# Patient Record
Sex: MALE | Race: WHITE | Employment: OTHER | URBAN - NONMETROPOLITAN AREA
[De-identification: names, ages, dates, MRNs, and addresses within clinical notes are randomized per-mention and may not be internally consistent; named-entity substitution may affect disease eponyms.]

---

## 2017-12-27 ENCOUNTER — HOSPITAL ENCOUNTER (OUTPATIENT)
Dept: PHYSICAL THERAPY | Age: 57
Setting detail: THERAPIES SERIES
Discharge: HOME | End: 2017-12-27
Payer: OTHER GOVERNMENT

## 2017-12-27 PROCEDURE — 97161 PT EVAL LOW COMPLEX 20 MIN: CPT

## 2017-12-27 PROCEDURE — 97110 THERAPEUTIC EXERCISES: CPT

## 2017-12-27 ASSESSMENT — PAIN DESCRIPTION - PAIN TYPE: TYPE: CHRONIC PAIN

## 2017-12-27 ASSESSMENT — PAIN DESCRIPTION - LOCATION: LOCATION: BACK;HIP

## 2017-12-27 ASSESSMENT — PAIN SCALES - GENERAL: PAINLEVEL_OUTOF10: 8

## 2017-12-27 NOTE — PROGRESS NOTES
I certify that I have examined the patient below and determined that Physical Medicine and Rehabilitation service is necessary; that the secondary diagnosis for the provision of rehabilitation services is consistent with identified needs; that service will be furnished on an outpatient basis while the patient is in my care; that I approve the above plan of care for up to 90 days or as specifically noted above and will review it within that time frame or more often if the patients condition requires. Attestation, signature or co-signature of physician indicates approval of certification requirements.    ________________________ ____________ __________  Physician Signature   Date   Time       [address]     Time In: 1500  Time Out: 1550  Minutes: 50  Timed Code Treatment Minutes: 10 Minutes (Ther EX)     Date: 2017  Patient Name: Beth Whitley,  Gender:  male        CSN: [id number]   : 1960  (56 y.o.)  Referral Date : 17     Referring Practitioner: Ellie Antonio      Diagnosis: Low back pain M54.5  Treatment Diagnosis: Lumbago, spinal stiffness, B hip pain, B hip stiffness, muscular weakness  Additional Pertinent Hx: Hx of heart attack (mini MI, non surgical), HTN, arthritis, SOB and chest pain occasional       General:  PT Visit Information  Onset Date: 17  PT Insurance Information: [place] - 1-2 times per week for 6-8 weeks total 12 visits (auth 17 to 18) - Aquatic YES, modalities YES, no precert  Total # of Visits Approved: 12  Total # of Visits to Date: 1  Plan of Care/Certification Expiration Date: 18  Progress Note Counter: 1/10 for PN       See Medical History Questionnaire for information related to allergies and medications.     Subjective:  Chart Reviewed: Yes  Patient assessed for rehabilitation services?: Yes  Family / Caregiver Present: Yes (Spouse)  Comments: Physician MARTA POS, hip flexor prone tightness    LLE General PROM: Hamstring 0-90 deg, knee to chest WFL, knee to opp shoulder painful, MARTA POS, hip flexor prone tightness    Lumbar: Lumbar flexion 0-90 deg standing, extension 0-10 deg painful, sidebend 0-10 deg painful and guarded    Strength RLE: Exception  Comment: Hip ext 4/5, abd 4/5, SLR 4-/5 R hip pain, knee ext 5/5    Strength LLE: Exception  Comment: Hip ext 4/5, abd 4/5, SLR 4/5, knee ext 5/5    Overall Sensation Status: WNL (B foot tingling)           Supine to Sit: Independent  Sit to Supine: Independent                Transfers  Sit to Stand: Independent  Stand to sit: Independent            Ambulation 1  Surface: carpet  Device: No Device  Assistance: Independent  Quality of Gait: Decreased gait speed, decreased B step length, mild forward flexed posture  Distance: 200 ft about clinic      Stairs  # Steps : 4  Stairs Height: 6\"  Rails: None  Device: No Device  Assistance: Independent  Comment: Reciprocal pattern, no antalgia, no LOB      Balance  Single Leg Stance R Leg: 10  Single Leg Stance L Leg: 10           Exercises  Exercise 1: HEP handout reviewed with patient - posterior tilt x5 reps, bridge x2 reps, prone hip flexor stretch, supine hamstring stretch, knee to opp shoulder, and LTR with knee across body  Exercise 2: 3 way hip x3 reps each LE - mild hip pain R SLR  Exercise 3: Next visit - Nu Step warm up, 3 way hip strength, hip and piriformis stretching, core stabilization        Activity Tolerance:  Activity Tolerance: Patient Tolerated treatment well    Assessment: Body structures, Functions, Activity limitations: Decreased ROM, Decreased strength, Decreased high-level IADLs  Assessment: Patient presents with chronic pain in lumbar spine, B SI, and B hips consistent with physician diagnosis. 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  He would benefit from PT to increase flexibility and strength for lumbar stabilization and improved tolerance for work tasks. Prognosis: Excellent       Patient Education:  Patient Education: HEP handout provided - knee to opp shoulder, LTR, prone hip flexor stretch, supine hamstring stretch, posterior tilt, bridge                   Plan:  Times per week: 2x/week  Plan weeks: 8 weeks  Specific instructions for Next Treatment: Next visit - Nu Step warm up, 3 way hip strength, hip and piriformis stretching, core stabilization  Current Treatment Recommendations: Strengthening, ROM, Balance Training, Stair training, Gait Training, Manual Therapy - Joint Manipulation, Manual Therapy - Soft Tissue Mobilization, Home Exercise Program, Modalities (Modalities may include ultrasound, electrical stim with heat/cold, aquatic therapy, or traction as needed for pain)  Plan Comment: Initiated PT POC    History: Personal factors or comorbidities that impact plan of care -  Moderate Complexity: 1-2 personal factors or comorbidities. See history section above for details. Examination: Body structures and functions, activity limitations, participation restrictions; using standardized tests and measures - Moderate Complexity: 3 or morebody structures and functional, activity limitations and/or participation restrictions. See restrictions and objective section above for details. Clinical Presentation: Low - Stable and Uncomplicated: chronic LBP, unable to reproduce radicular symptoms    Decision Making: Low Complexity due to see above. Decision making was based on patient assessment and decision making process in determining plan of care and establishing reasonable expectations for measurable functional outcomes. Evaluation Complexity: Based on the findings of patient history, examination, clinical presentation, and decision making during this evaluation, the evaluation of Hank Nelson  is of low complexity.     Goals:  Patient goals : Decrease back and hip pain, work without

## 2018-01-02 ENCOUNTER — HOSPITAL ENCOUNTER (OUTPATIENT)
Dept: PHYSICAL THERAPY | Age: 58
Setting detail: THERAPIES SERIES
Discharge: HOME | End: 2018-01-02
Payer: OTHER GOVERNMENT

## 2018-01-02 PROCEDURE — 97110 THERAPEUTIC EXERCISES: CPT

## 2018-01-02 ASSESSMENT — PAIN SCALES - GENERAL: PAINLEVEL_OUTOF10: 8

## 2018-01-02 ASSESSMENT — PAIN DESCRIPTION - ORIENTATION: ORIENTATION: RIGHT;LEFT;LOWER

## 2018-01-02 ASSESSMENT — PAIN DESCRIPTION - PAIN TYPE: TYPE: CHRONIC PAIN

## 2018-01-02 ASSESSMENT — PAIN DESCRIPTION - LOCATION: LOCATION: BACK;HIP

## 2018-01-02 NOTE — PROGRESS NOTES
tilt with marching x5 each LE, bridge 5 sec x10 reps   Exercise 4: prone hip flexor stretch with strap 3x 10 sec, supine B hamstring stretch with strap 2x 30 sec, knee to opp shoulder 2x 30 sec, and LTR with knee across body 2x 20 sec         Activity Tolerance:  Activity Tolerance: Patient Tolerated treatment well    Assessment: Body structures, Functions, Activity limitations: Decreased ROM, Decreased strength, Decreased high-level IADLs  Assessment: Reviewed HEP with mod verbal cues for technique and use of strap. Occasional hamstring cramping during session especially with bridges. Primary compliant is hip pain today. Prognosis: Excellent       Patient Education:  Patient Education: HEP handout Reviewed - knee to opp shoulder, LTR, prone hip flexor stretch, supine hamstring stretch, posterior tilt, bridge. Patient asking about heating pad - advised that it can be easier to stretch after using heat    Plan:  Times per week: 2x/week  Plan weeks: 8 weeks  Specific instructions for Next Treatment: Next visit - Nu Step warm up, 3 way hip strength, hip and piriformis stretching, core stabilization  Current Treatment Recommendations: Strengthening, ROM, Balance Training, Stair training, Gait Training, Manual Therapy - Joint Manipulation, Manual Therapy - Soft Tissue Mobilization, Home Exercise Program, Modalities (Modalities may include ultrasound, electrical stim with heat/cold, aquatic therapy, or traction as needed for pain)  Plan Comment: Continue PT POC    Goals:  Patient goals : Decrease back and hip pain, work without difficulty    Short term goals  Time Frame for Short term goals: 4 weeks  Short term goal 1: Patient will report decreased back and hip pain from baseline 8/10 to less than 4/10 in order to tolerate sleeping without disruption.   Short term goal 2: Patient will increase B hamstring flexibility to 0-100 deg hip flexion, prone quad stretch to 0-100 deg knee flex, and knee to opp shoulder WFL for piriformis in order to decrease hip pain. Short term goal 3: Patient will demonstrate good abdominal brace with SLR x4 directions no hip pain or back pain in order to stabilize lumbar with reaching for objects at work. Long term goals  Time Frame for Long term goals : 8 weeks  Long term goal 1: Patient will improve score for Lower Extremity Functional Index from baseline 50% impairment to less than 40% impairment. Long term goal 2: Patient will lunge, squat, rise from floor to standing, lunge walk forward and lateral all without increased pain in order to perform work tasks. Long term goal 3: Patient will report no difficulty standing long durations or climbing stairs at work. Long term goal 4: Patient will be IND with HEP.           Serenity Chavira, PT

## 2018-01-04 ENCOUNTER — HOSPITAL ENCOUNTER (OUTPATIENT)
Dept: PHYSICAL THERAPY | Age: 58
Setting detail: THERAPIES SERIES
Discharge: HOME | End: 2018-01-04
Payer: OTHER GOVERNMENT

## 2018-01-04 PROCEDURE — 97110 THERAPEUTIC EXERCISES: CPT

## 2018-01-04 ASSESSMENT — PAIN SCALES - GENERAL: PAINLEVEL_OUTOF10: 7

## 2018-01-09 ENCOUNTER — HOSPITAL ENCOUNTER (OUTPATIENT)
Dept: PHYSICAL THERAPY | Age: 58
Setting detail: THERAPIES SERIES
Discharge: HOME | End: 2018-01-09
Payer: OTHER GOVERNMENT

## 2018-01-11 ENCOUNTER — HOSPITAL ENCOUNTER (OUTPATIENT)
Dept: PHYSICAL THERAPY | Age: 58
Setting detail: THERAPIES SERIES
Discharge: HOME | End: 2018-01-11
Payer: OTHER GOVERNMENT

## 2018-01-11 PROCEDURE — 97110 THERAPEUTIC EXERCISES: CPT

## 2018-01-11 ASSESSMENT — PAIN SCALES - GENERAL: PAINLEVEL_OUTOF10: 6

## 2018-01-11 ASSESSMENT — PAIN DESCRIPTION - PAIN TYPE: TYPE: CHRONIC PAIN

## 2018-01-11 ASSESSMENT — PAIN DESCRIPTION - LOCATION: LOCATION: BACK;HIP

## 2018-01-11 NOTE — PROGRESS NOTES
[place]     Time In: 1630  Time Out: 1710  Minutes: 40  Timed Code Treatment Minutes: 40 Minutes     Date: 2018  Patient Name: Btety Mcdaniel,  Gender:  male        CSN: [id number]   : 1960  (56 y.o.)  Referral Date : 17    Referring Practitioner: Mandy Cooper      Diagnosis: Low back pain M54.5  Treatment Diagnosis: Lumbago, spinal stiffness, B hip pain, B hip stiffness, muscular weakness   Additional Pertinent Hx: Hx of heart attack (mini MI, non surgical), HTN, arthritis, SOB and chest pain occasional                  General:  PT Visit Information  Onset Date: 17  PT Insurance Information: [place] - 1-2 times per week for 6-8 weeks total 12 visits (auth 17 to 18) - Aquatic YES, modalities YES, no precert  Total # of Visits Approved: 12  Total # of Visits to Date: 4  Plan of Care/Certification Expiration Date: 18  Progress Note Counter: 4/10 for PN               Subjective:  Chart Reviewed: Yes  Response To Previous Treatment: Patient with no complaints from previous session. Family / Caregiver Present: Yes  Comments: Physician follow up: Dr. Ricardo Gardiner unknown  Other (Comment): 1-2x/week 6-8 weeks, max 12 visits     Subjective: I'm sore  in my LB and hips. Doing HEP and using heat off/on     Pain:  Patient Currently in Pain: Yes  Pain Level: 6  Pain Type: Chronic pain  Pain Location: Back, Hip      Objective         Exercises  Exercise 1: NuStep warm up (Seat 9, Arms 11) Level 3, x6 minutes - cues for upright posture   Exercise 3: Seated on yellow swiss ball x15 rows/sh ext yellow band , marching 1 leg at a time  mild diff with core stabs Denied pain  Exercise 6: Standing with bracing: heel raises, squats, 3-way hip x15 each   Exercise 7: Standing B hamstring stretches x3 hold 10 sec. at steps   Exercise 8:  In hooklying: SKTC x 3 hold 10 sec B., Diag KTC x3 B hold 10 sec., LTR x3 hold 10 sec B, piriformis pushing knee away from body in #4# position         Activity Tolerance:  Activity Tolerance: Patient Tolerated treatment well  Activity Tolerance: Added standing hamstring stretch, variations of LTR and piriformis for HEP, Progressed core stabs and tolerated well. Assessment: Body structures, Functions, Activity limitations: Decreased ROM, Decreased strength, Decreased high-level IADLs  Assessment: Pain 6-7 /10 after session. Cues( occ tactile)  for abd braicng with functional activites. No cramping stated today. Monitor any pain from todays session. Progressed with gentle nautilus and swiss ball exs. Cues for posture. Prognosis: Excellent       Patient Education:  Patient Education: Continue HEP, use of heat before/during exercises. . LTR, piriformis, st hamstring                      Plan:  Times per week: 2x/week  Plan weeks: 8 weeks  Specific instructions for Next Treatment: Next visit - Nu Step warm up, 3 way hip strength, hip and piriformis stretching, core stabilization  Current Treatment Recommendations: Strengthening, ROM, Balance Training, Stair training, Gait Training, Manual Therapy - Joint Manipulation, Manual Therapy - Soft Tissue Mobilization, Home Exercise Program, Modalities (Modalities may include ultrasound, electrical stim with heat/cold, aquatic therapy, or traction as needed for pain)  Plan Comment: Continue with current POC    Goals:  Patient goals : Decrease back and hip pain, work without difficulty    Short term goals  Time Frame for Short term goals: 4 weeks  Short term goal 1: Patient will report decreased back and hip pain from baseline 8/10 to less than 4/10 in order to tolerate sleeping without disruption. Short term goal 2: Patient will increase B hamstring flexibility to 0-100 deg hip flexion, prone quad stretch to 0-100 deg knee flex, and knee to opp shoulder WFL for piriformis in order to decrease hip pain.   Short term goal 3: Patient will demonstrate

## 2018-01-16 ENCOUNTER — HOSPITAL ENCOUNTER (OUTPATIENT)
Dept: PHYSICAL THERAPY | Age: 58
Setting detail: THERAPIES SERIES
Discharge: HOME | End: 2018-01-16
Payer: OTHER GOVERNMENT

## 2018-01-16 PROCEDURE — 97110 THERAPEUTIC EXERCISES: CPT

## 2018-01-16 ASSESSMENT — PAIN DESCRIPTION - PAIN TYPE: TYPE: CHRONIC PAIN

## 2018-01-16 ASSESSMENT — PAIN DESCRIPTION - LOCATION: LOCATION: HIP;BACK

## 2018-01-16 ASSESSMENT — PAIN SCALES - GENERAL: PAINLEVEL_OUTOF10: 8

## 2018-01-16 ASSESSMENT — PAIN DESCRIPTION - ORIENTATION: ORIENTATION: LEFT;RIGHT;LOWER

## 2018-01-18 ENCOUNTER — HOSPITAL ENCOUNTER (OUTPATIENT)
Dept: PHYSICAL THERAPY | Age: 58
Setting detail: THERAPIES SERIES
Discharge: HOME | End: 2018-01-18
Payer: OTHER GOVERNMENT

## 2018-01-18 PROCEDURE — 97110 THERAPEUTIC EXERCISES: CPT

## 2018-01-18 ASSESSMENT — PAIN SCALES - GENERAL: PAINLEVEL_OUTOF10: 9

## 2018-01-18 NOTE — PROGRESS NOTES
[place]     Time In: 8887  Time Out: [address]  Minutes: 38  Timed Code Treatment Minutes: 45 Minutes     Date: 2018  Patient Name: Gianni Hays,  Gender:  male        CSN: [id number]   : 1960  (56 y.o.)  Referral Date : 17    Referring Practitioner: Ellis Cortes      Diagnosis: Low back pain M54.5  Treatment Diagnosis: Lumbago, spinal stiffness, B hip pain, B hip stiffness, muscular weakness   Additional Pertinent Hx: Hx of heart attack (mini MI, non surgical), HTN, arthritis, SOB and chest pain occasional        General:  PT Visit Information  Onset Date: 17  PT Insurance Information: [place] - 1-2 times per week for 6-8 weeks total 12 visits (auth 17 to 18) - Aquatic YES, modalities YES, no precert  Total # of Visits Approved: 12  Total # of Visits to Date: 6  Plan of Care/Certification Expiration Date: 18  Progress Note Counter: 6/10 for PN             Subjective:  Chart Reviewed: Yes  Family / Caregiver Present: Yes  Comments: Physician follow up: Dr. Jefferson Nevarez unknown  Other (Comment): 1-2x/week 6-8 weeks, max 12 visits     Subjective: Patient states he has been working 12 and 16 hour days because of overtime at work and is having more pain today. Reports he is trying to remember to keep his stomach muscles tight but doesn't always remember.       Pain:  Patient Currently in Pain: Yes  Pain Assessment: 0-10  Pain Level: 9 (8-9/10 Lower back, Bilateral hips)    Objective         Exercises  Exercise 1: NuStep warm up (Seat 9, Arms 11) Level 3, x6 minutes - cues for upright posture   Exercise 2: Hooklying hip adduction set ball squeeze with bracing 10x 5 seconds, hip abduction with red theraband x10, straight leg raises with bracing x10 bilateral   Exercise 3: Seated on yellow swiss ball: UE rows, shoulder extension, horizontal abduction with yellow band, marching 1 leg at a time x10 each - mild diff with core stabs Denied pain  Exercise 5: Reviewed education on use of pillow between knees for sidelying position and log rolling technique with bed mobility and use of heat  Exercise 6: Standing with bracing: heel raises, marches, squats, 3-way hip x15 each   Exercise 7: Standing Bilateral hamstring stretches x3 hold 10 seconds at steps   Exercise 8: In hooklying: SKTC, Diag KTC, LTR x3 hold 15 seconds Bilateral, piriformis pushing knee away from body in figure 4 position 3x 15 seconds       Activity Tolerance:  Activity Tolerance: Patient Tolerated treatment well  Activity Tolerance: Pain remained 8-9/10 at end of session. Assessment: Body structures, Functions, Activity limitations: Decreased functional mobility , Decreased ROM, Decreased strength, Decreased endurance  Assessment: Patient continues to require frequent cues for abdominal bracing with exercises. Did not progress exercises this date due to patient having higher pain levels. Pain rated 7-8/10 at end of session. Patient Education:  Patient Education: Continue HEP, use of heat before/during exercises.      Plan:  Times per week: 2x/week  Plan weeks: 8 weeks  Specific instructions for Next Treatment: NuStep warm up, 3 way hip strength, hip and piriformis stretching, core stabilization  Current Treatment Recommendations: Strengthening, ROM, Balance Training, Stair training, Gait Training, Manual Therapy - Joint Manipulation, Manual Therapy - Soft Tissue Mobilization, Home Exercise Program, Modalities (Modalities may include ultrasound, electrical stim with heat/cold, aquatic therapy, or traction as needed for pain)  Plan Comment: Continue with current POC    Goals:  Patient goals : Decrease back and hip pain, work without difficulty    Short term goals  Time Frame for Short term goals: 4 weeks  Short term goal 1: Patient will report decreased back and hip pain from baseline 8/10 to less than 4/10 in order to tolerate sleeping without disruption. Short term goal 2: Patient will increase B hamstring flexibility to 0-100 deg hip flexion, prone quad stretch to 0-100 deg knee flex, and knee to opp shoulder WFL for piriformis in order to decrease hip pain. Short term goal 3: Patient will demonstrate good abdominal brace with SLR x4 directions no hip pain or back pain in order to stabilize lumbar with reaching for objects at work. Long term goals  Time Frame for Long term goals : 8 weeks  Long term goal 1: Patient will improve score for Lower Extremity Functional Index from baseline 50% impairment to less than 40% impairment. Long term goal 2: Patient will lunge, squat, rise from floor to standing, lunge walk forward and lateral all without increased pain in order to perform work tasks. Long term goal 3: Patient will report no difficulty standing long durations or climbing stairs at work. Long term goal 4: Patient will be IND with HEP. Jacoby Alvarado.  Jacob Garrett, [facility], 1/18/2018

## 2018-01-23 ENCOUNTER — APPOINTMENT (OUTPATIENT)
Dept: PHYSICAL THERAPY | Age: 58
End: 2018-01-23
Payer: OTHER GOVERNMENT

## 2018-01-25 ENCOUNTER — APPOINTMENT (OUTPATIENT)
Dept: PHYSICAL THERAPY | Age: 58
End: 2018-01-25
Payer: OTHER GOVERNMENT

## 2018-01-30 ENCOUNTER — HOSPITAL ENCOUNTER (OUTPATIENT)
Dept: PHYSICAL THERAPY | Age: 58
Setting detail: THERAPIES SERIES
Discharge: HOME | End: 2018-01-30
Payer: OTHER GOVERNMENT

## 2018-01-30 PROCEDURE — 97110 THERAPEUTIC EXERCISES: CPT

## 2018-01-30 ASSESSMENT — PAIN DESCRIPTION - ORIENTATION: ORIENTATION: LOWER;LEFT;RIGHT

## 2018-01-30 ASSESSMENT — PAIN SCALES - GENERAL: PAINLEVEL_OUTOF10: 7

## 2018-01-30 ASSESSMENT — PAIN DESCRIPTION - LOCATION: LOCATION: BACK

## 2018-01-30 ASSESSMENT — PAIN DESCRIPTION - PAIN TYPE: TYPE: CHRONIC PAIN

## 2018-01-30 NOTE — PROGRESS NOTES
[address] [facility]     Time In: 8082  Time Out: 1594  Minutes: 50  Timed Code Treatment Minutes: 48 Minutes     Date: 2018  Patient Name: Gianni Hays,  Gender:  male        CSN: [id number]   : 1960  (56 y.o.)  Referral Date : 17    Referring Practitioner: Ellis Cortes      Diagnosis: Low back pain M54.5  Treatment Diagnosis: Lumbago, spinal stiffness, B hip pain, B hip stiffness, muscular weakness   Additional Pertinent Hx: Hx of heart attack (mini MI, non surgical), HTN, arthritis, SOB and chest pain occasional       General:  PT Visit Information  Onset Date: 17  PT Insurance Information: [place] - 1-2 times per week for 6-8 weeks total 12 visits (auth 17 to 18) - Aquatic YES, modalities YES, no precert  Total # of Visits Approved: 12  Total # of Visits to Date: 7  Plan of Care/Certification Expiration Date: 18  Progress Note Counter: 7/10 for PN             Subjective:  Chart Reviewed: Yes  Response To Previous Treatment: Patient with no complaints from previous session. Family / Caregiver Present: Yes  Comments: Physician follow up: Dr. Jefferson Nevarez unknown  Other (Comment): 1-2x/week 6-8 weeks, max 12 visits     Subjective: Patient states he has been working 12 hours days and will be going to 2nd shift. States he is sore after work. Patient states he doesn't feel comfortable on physioball, \" I'm all over the place. \"      Pain:  Patient Currently in Pain: Yes  Pain Level: 7  Pain Type: Chronic pain  Pain Location: Back  Pain Orientation: Lower, Left, Right    Objective         Exercises  Exercise 1: NuStep warm up (Seat 9, Arms 11) Level 3, x 10 minutes - cues for upright posture   Exercise 2: Hooklying hip adduction set ball squeeze with bracing 10x 5 seconds, hip abduction with red theraband x15, straight leg raises with bracing x10 bilateral   Exercise 3: Seated on chair and gray stabilty

## 2018-02-01 ENCOUNTER — HOSPITAL ENCOUNTER (OUTPATIENT)
Dept: PHYSICAL THERAPY | Age: 58
Setting detail: THERAPIES SERIES
Discharge: HOME | End: 2018-02-01
Payer: OTHER GOVERNMENT

## 2018-02-01 ENCOUNTER — HOSPITAL ENCOUNTER (OUTPATIENT)
Dept: PHYSICAL THERAPY | Age: 58
Setting detail: THERAPIES SERIES
End: 2018-02-01
Payer: OTHER GOVERNMENT

## 2018-02-01 NOTE — PROGRESS NOTES
[facility]  PHYSICAL THERAPY MISSED TREATMENT NOTE  OUTPATIENT REHABILITATION    Date: 2018  Patient Name: Amaya Nicole        MRN: [id number]   : 1960  (56 y.o.)  Gender: male           PT Visit Information  Canceled Appointment: 2    REASON FOR MISSED TREATMENT:  Cancelled -no reason given.       Camron Yip [id number]

## 2018-02-06 ENCOUNTER — APPOINTMENT (OUTPATIENT)
Dept: PHYSICAL THERAPY | Age: 58
End: 2018-02-06
Payer: OTHER GOVERNMENT

## 2018-02-07 ENCOUNTER — HOSPITAL ENCOUNTER (OUTPATIENT)
Dept: PHYSICAL THERAPY | Age: 58
Setting detail: THERAPIES SERIES
Discharge: HOME | End: 2018-02-07
Payer: OTHER GOVERNMENT

## 2018-02-07 PROCEDURE — 97110 THERAPEUTIC EXERCISES: CPT

## 2018-02-07 ASSESSMENT — PAIN DESCRIPTION - ORIENTATION: ORIENTATION: RIGHT;LEFT;LOWER

## 2018-02-07 ASSESSMENT — PAIN SCALES - GENERAL: PAINLEVEL_OUTOF10: 6

## 2018-02-07 ASSESSMENT — PAIN DESCRIPTION - PAIN TYPE: TYPE: CHRONIC PAIN

## 2018-02-07 ASSESSMENT — PAIN DESCRIPTION - LOCATION: LOCATION: BACK;HIP

## 2018-02-07 NOTE — PROGRESS NOTES
[address] [facility]     Time In: 9771  Time Out: 1053  Minutes: 32  Timed Code Treatment Minutes: 32 Minutes     Date: 2018  Patient Name: Simone Stockton,  Gender:  male        CSN: [id number]   : 1960  (56 y.o.)  Referral Date : 17    Referring Practitioner: Nehemias Ferguson      Diagnosis: Low back pain M54.5  Treatment Diagnosis: Lumbago, spinal stiffness, B hip pain, B hip stiffness, muscular weakness   Additional Pertinent Hx: Hx of heart attack (mini MI, non surgical), HTN, arthritis, SOB and chest pain occasional                  General:  PT Visit Information  Onset Date: 17  PT Insurance Information: [place] - 1-2 times per week for 6-8 weeks total 12 visits (auth 17 to 18) - Aquatic YES, modalities YES, no precert  Total # of Visits Approved: 12  Total # of Visits to Date: 8  Plan of Care/Certification Expiration Date: 18  Progress Note Counter: 8/10 for PN               Subjective:  Response To Previous Treatment: Patient with no complaints from previous session. Family / Caregiver Present: No  Comments: Physician follow up: Dr. Wilkerson Catching unknown  Other (Comment): 1-2x/week 6-8 weeks, max 12 visits     Subjective: Pt reporting back pain 6/10 today and stating that pain has been worse. Pt reporting needing to leave right at or before 11 due to work schedule being changed.       Pain:  Patient Currently in Pain: Yes  Pain Assessment: 0-10  Pain Level: 6  Pain Type: Chronic pain  Pain Location: Back, Hip  Pain Orientation: Right, Left, Lower      Objective  Exercises  Exercise 2: Hook lying hip adduction set ball squeeze with bracing 10x 5 seconds, hip abduction with red theraband x15, straight leg raises with bracing x10 bilateral   Exercise 3: Seated on chair and gray stability pad per P.T: UE rows, shoulder extension, horizontal abduction with yellow band, marching 1 leg at a time x15 each ,

## 2018-02-08 ENCOUNTER — APPOINTMENT (OUTPATIENT)
Dept: PHYSICAL THERAPY | Age: 58
End: 2018-02-08
Payer: OTHER GOVERNMENT

## 2018-02-08 ENCOUNTER — HOSPITAL ENCOUNTER (OUTPATIENT)
Dept: PHYSICAL THERAPY | Age: 58
Setting detail: THERAPIES SERIES
Discharge: HOME | End: 2018-02-08
Payer: OTHER GOVERNMENT

## 2018-02-08 PROCEDURE — 97110 THERAPEUTIC EXERCISES: CPT

## 2018-02-08 ASSESSMENT — PAIN DESCRIPTION - LOCATION: LOCATION: BACK

## 2018-02-08 ASSESSMENT — PAIN SCALES - GENERAL: PAINLEVEL_OUTOF10: 7

## 2018-02-08 ASSESSMENT — PAIN DESCRIPTION - ORIENTATION: ORIENTATION: RIGHT;LEFT;LOWER

## 2018-02-08 ASSESSMENT — PAIN DESCRIPTION - PAIN TYPE: TYPE: CHRONIC PAIN

## 2018-02-08 NOTE — PROGRESS NOTES
a time x15 each , LAQ x 10  Exercise 6: Standing with bracing on blue foam: heel raises, Toe raises, marches, squats, 3-way hip x15 each   Exercise 7: Standing Bilateral hamstring stretches x3 hold 10 seconds at steps   Exercise 8: In hooklying: SKTC, Diag KTC, LTR x3 hold 15 seconds Bilateral, piriformis pushing knee away from body in figure 4 position 3x 15 seconds  Exercise 9: Lunges onto BOSU x 10 B  Exercise 10: Hydro Stick with bracin directions x 10 each - no pain          Activity Tolerance:  Activity Tolerance: Patient Tolerated treatment well  Activity Tolerance: Pain \"same\" after session. Assessment: Body structures, Functions, Activity limitations: Decreased ROM, Decreased endurance, Decreased strength, Decreased functional mobility   Assessment: No progression of exs due to same PTA having pt on  and pt informed next  next therapist \" she killed me\" after adding swiss ball exs. Pt denied increased pain today. Emphasized HEP/abd braicng. Prognosis: Excellent  Discharge Recommendations: Continue to assess pending progress    Patient Education:  Patient Education: Monitor repsonse to exs. Plan:  Times per week: 2x/week  Plan weeks: 8 weeks  Specific instructions for Next Treatment: NuStep warm up, 3 way hip strength, hip and piriformis stretching, core stabilization  Current Treatment Recommendations: Strengthening, ROM, Balance Training, Stair training, Gait Training, Manual Therapy - Joint Manipulation, Manual Therapy - Soft Tissue Mobilization, Home Exercise Program, Modalities  Plan Comment: Continue with current POC    Goals:  Patient goals : Decrease back and hip pain, work without difficulty    Short term goals  Time Frame for Short term goals: 4 weeks  Short term goal 1: Patient will report decreased back and hip pain from baseline 8/10 to less than 4/10 in order to tolerate sleeping without disruption.   Short term goal 2: Patient will increase B hamstring flexibility to 0-100 deg hip flexion, prone quad stretch to 0-100 deg knee flex, and knee to opp shoulder WFL for piriformis in order to decrease hip pain. Short term goal 3: Patient will demonstrate good abdominal brace with SLR x4 directions no hip pain or back pain in order to stabilize lumbar with reaching for objects at work. Long term goals  Time Frame for Long term goals : 8 weeks  Long term goal 1: Patient will improve score for Lower Extremity Functional Index from baseline 50% impairment to less than 40% impairment. Long term goal 2: Patient will lunge, squat, rise from floor to standing, lunge walk forward and lateral all without increased pain in order to perform work tasks. Long term goal 3: Patient will report no difficulty standing long durations or climbing stairs at work. Long term goal 4: Patient will be IND with HEP.           Polo Novel  [id number]

## 2018-02-13 ENCOUNTER — HOSPITAL ENCOUNTER (OUTPATIENT)
Dept: PHYSICAL THERAPY | Age: 58
Setting detail: THERAPIES SERIES
Discharge: HOME | End: 2018-02-13
Payer: OTHER GOVERNMENT

## 2018-02-13 ENCOUNTER — APPOINTMENT (OUTPATIENT)
Dept: PHYSICAL THERAPY | Age: 58
End: 2018-02-13
Payer: OTHER GOVERNMENT

## 2018-02-13 PROCEDURE — 97110 THERAPEUTIC EXERCISES: CPT

## 2018-02-13 ASSESSMENT — PAIN DESCRIPTION - ORIENTATION: ORIENTATION: RIGHT;LEFT;LOWER

## 2018-02-13 ASSESSMENT — PAIN DESCRIPTION - PAIN TYPE: TYPE: CHRONIC PAIN

## 2018-02-13 ASSESSMENT — PAIN DESCRIPTION - LOCATION: LOCATION: BACK

## 2018-02-13 ASSESSMENT — PAIN SCALES - GENERAL: PAINLEVEL_OUTOF10: 8

## 2018-02-13 NOTE — PROGRESS NOTES
Straight leg raises with bracing x10 bilateral flexion, abduction, and prone extension - no increased pain  Exercise 4: Prone hip flexor stretch with strap 3x 10 seconds; Supine Bilateral hamstring stretch with strap 2x 30 seconds, knee to opposite shoulder 2x 30 seconds  Exercise 6: Standing with bracing mini squats x10, lunge walking forward and lateral in // bars (5 ft) with good abdominal bracing. Exercise 9: Lunges onto BOSU x 10 B         Activity Tolerance:  Activity Tolerance: Patient Tolerated treatment well    Assessment:  Assessment: Patient has made minimal progress toward therapy goals. He has same complaints of pain in B hips and low back, increased with standing and climbing steps at work. He demonstrates improved hip and abdominal core strength, and improved flexibility. He is compliant with HEP exercises. Recommend patient follow up with physician regarding continued pain in order to tolerate work tasks and sleep comfortably. Patient Education:  Patient Education: Reviewed HEP - continue stretches and leg raises, standing strength exercises and abdominal bracing. Plan:  Plan Comment: Discharge from PT    Goals:  Patient goals : Decrease back and hip pain, work without difficulty - Not met 2/13/18 - pain levels remain high, working increases pain each shift    Short term goals  Time Frame for Short term goals: 4 weeks  Short term goal 1: Patient will report decreased back and hip pain from baseline 8/10 to less than 4/10 in order to tolerate sleeping without disruption. - Not met 2/13/18 - pain levels remain high, 8/10. Sleeping is still limited due to pain. Short term goal 2: Patient will increase B hamstring flexibility to 0-100 deg hip flexion, prone quad stretch to 0-100 deg knee flex, and knee to opp shoulder WFL for piriformis in order to decrease hip pain.  - Not met 2/13/18 - knee to opp shoulder WFL but tight, hamstring B 0-85 deg,   Short term goal 3: Patient will demonstrate good abdominal brace with SLR x4 directions no hip pain or back pain in order to stabilize lumbar with reaching for objects at work. - Goal Met 2/13/18 - 10 reps each direction, demonstrates good abdominal bracing. \"These used to give me muscle cramps but not today\"     Long term goals  Time Frame for Long term goals : 8 weeks   Long term goal 1: Patient will improve score for Lower Extremity Functional Index from baseline 50% impairment to less than 40% impairment. - Not Met 2/13/18 - minimal change - 44% impairment   Long term goal 2: Patient will lunge, squat, rise from floor to standing, lunge walk forward and lateral all without increased pain in order to perform work tasks. - Goal met 2/13/18 - denies increased pain with these activities, but slow and guarded during activities. Long term goal 3: Patient will report no difficulty standing long durations or climbing stairs at work. 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    Long term goal 4: Patient will be IND with HEP. - Goal Met 2/13/18          Tommy Valentino, PT

## 2021-03-04 ENCOUNTER — HOSPITAL ENCOUNTER (OUTPATIENT)
Dept: PHYSICAL THERAPY | Age: 61
Setting detail: THERAPIES SERIES
Discharge: HOME | End: 2021-03-04
Payer: OTHER GOVERNMENT

## 2021-03-04 PROCEDURE — 97162 PT EVAL MOD COMPLEX 30 MIN: CPT

## 2021-03-04 PROCEDURE — 97110 THERAPEUTIC EXERCISES: CPT

## 2021-03-04 NOTE — FLOWSHEET NOTE
** PLEASE SIGN, DATE AND TIME CERTIFICATION BELOW AND RETURN TO [facility] OUT[facility] (FAX #: [phone]). ATTEST/CO-SIGN IF ACCESSING VIA INTocomail. THANK YOU.**    I certify that I have examined the patient below and determined that Physical Medicine and Rehabilitation service is necessary and that I approve the established plan of care for up to 90 days or as specifically noted. Attestation, signature or co-signature of physician indicates approval of certification requirements.    ________________________ ____________ __________  Physician Signature   Date   Time  [address] [facility]  PHYSICAL THERAPY  [x] EVALUATION  [] DAILY NOTE (LAND) [] DAILY NOTE (AQUATIC ) [] PROGRESS NOTE [] DISCHARGE NOTE    [x] [address] [facility]   [] [place] [address]    [] [facility]   [] [facility]    Date: 3/4/2021  Patient Name:  Kimberlee Lopez  : 1960  MRN: [id number]  CSN: [id number]    Referring Practitioner Emelda Dubin, DO   Diagnosis Low back pain [M54.5]    Treatment Diagnosis Low back pain, B hip pain, stiffness in spine and hips, muscle weakness, difficulty walking, poor posture   Date of Evaluation 3/4/21    Additional Pertinent History Multiple MI (most recent 2019) with 3 stents, HTN, a-fib, emphysema with SOB, anxiety      Functional Outcome Measure Used Back Index   Functional Outcome Score 54% disability (3/4/21)       Insurance: Primary: Payor: 'S ADMINISTRATION /  /  / ,   Secondary:    Authorization Information: No precert; aquatics, modalities are covered, no massage   Visit # 1, 1/10 for progress note   Visits Allowed: 15 visits   Recertification Date:    Physician Follow-Up: May 2021? Physician Orders:    History of Present Illness: Patient presents with chronic low back pain. Patient reports that 20-25 years ago he was moving a refrigerator and \"put my back out\" resulting in injury to L5. Patient went to chiropractor extensively for 2.5 years with symptoms eventually subsiding. Then 5-6 years ago, he began having pain in the low back and bilateral hips. He reports recent imaging revealed presence of arthritis in both hips. Patient takes hydrocodone daily to manage this but would like to reduce usage of pain medication. Patient reports use of heating pad seems to help as well. Patient reports that once in a great while he will experience shooting pain into LLE. Patient denies presence of numbness or tingling in BLE. Negative valsalva. SUBJECTIVE: Patient reports 5/10 pain in central low back. Pain does at times elevate to 9/10. Patient notes very poor sleep and is getting 2-3 hours per night due to being unable to get comfortable in bed. Patient is taking sleeping medication for this now. Stair climbing is very painful due to hip pain and has to climb non-reciprocally. Patient reports he was recently approved for disability as he could not meet job requirements and last date worked ([employer]) 9/23/2020. Patient reports next step would be pain management for injections as he was told it is not to the point of surgery. Social/Functional History and Current Status:  Medications and Allergies have been reviewed and are listed on Medical History Questionnaire. Kimberlee Lopez lives with spouse in a single story home with stairs and no handrail to enter. Task Previous Current   ADLs  Independent Modified Independent; difficulty crossing legs to benedict socks and shoes   IADL's Independent Modified Independent; Has to break tasks up into 15 minute intervals. Increased time to complete tasks. 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 Ambulation  Independent Independent; Walking on concrete or blacktop is limited to ~30 minutes but softer surfaces maybe longer. Uses cane when pain is severe. Transfers  Independent Modified Independent; uses cane to get up   Driving Active  Active    Work On Disability  On Disability     OBJECTIVE:  Pain Low back 5/10 central spine   Palpation TTP spinous process L1-S1, lumbar paraspinals and QL R>L   Observation    Posture fair ; FHP with rounded shoulders, sits in L lateral trunk shift and tends to stand forward flexed       Range of Motion see below   Strength see below; core strength is poor with patient needing increased cues to isolate TrA. Sensation WNL   Bed Mobility WNL   Transfers WNL   Ambulation Independent  Distance: 200 feet in clinic  Device:No Device  Gait Deviations:   Forward Flexed Posture, bilateral trendelenberg, antalgia, decreased stance phase on RLE   Balance Not Tested   Special Tests Lumbar distraction did not alter symptoms, MARTA position painful, Hip Scour positive bilateral         BACK RANGE OF MOTION   Flexion 67 deg with pain at end range   Extension 10 deg   Sidebending Right 16 deg with pain in R sacral region   Sidebending Left 18 deg   Back Range of Motion is [facility]  []     LE STRENGTH R L   Hip Flexion 4/5 4-/5   Hip Abduction 4-/5 4/5   Hip Adduction 4+/5 4+/5   Knee Flexion 4/5 4+/5   Knee Extension 4/5 4+/5   Ankle DF 5/5 5/5   Ankle PF 5/5 5/5         TREATMENT   Precautions: Multiple MI with stents, HTN   Pain: 5/10 central low back, B hips    X in shaded column indicates activity completed today   Modalities Parameters/  Location  Notes   Moist Heat   May initiate during supine strengthening               Manual Therapy Time/Technique  Notes                     Exercise/Intervention   Notes   Piriformis & Hamstring Stretch 2B each way 20 seconds X                  Ab Brace 10 3 seconds X    Ab Brace March 10B  X    Ab Brace SAQ 10B 3 seconds X    Ab Brace ball squeeze 10B 3 seconds X    Bridge 10 3 seconds X    Clamshell, reverse clamshell                            Seated Ab Brace on [employer]       \"  \" march, LAQ       Hip abduction/adduction with ball and band       Rows, shoulder extension with Theraband       Hydrohip       Hydrostick         Specific Interventions Next Treatment: Start on land with core stabilization (abdominal bracing series) and hip strengthening in all positions but if it is not well tolerated, may consider aquatics to [place] the joints to allow ease of strengthening. Activity/Treatment Tolerance:  [x]  Patient tolerated treatment well  []  Patient limited by fatigue  []  Patient limited by pain   []  Patient limited by medical complications  []  Other:     Assessment: Patient is a 63year old male presenting with chronic low back pain that began with a lifting injury 20+ years ago. Patient initially managed the pain with chiropractic care but roughly 5-6 years ago pain increased again and seems to be progressively worsening with patient now also experiencing B hip pain and stiffness due to OA. Patient today demonstrates weakness in core and hip musculature, poor posture with position of lumbar flexion preferred, and stiffness in lumbar spine and hips. Did discuss with patient that will begin with land based strengthening but if not well tolerated may trial the pool. Body Structures/Functions/Activity Limitations: impaired activity tolerance, impaired endurance, impaired ROM, impaired strength, pain and abnormal gait  Prognosis: fair    GOALS:  Patient Goal: Get some mobility back and not hurt doing it. Short Term Goals:  Time Frame: 4 weeks    1. Patient will report reduction of pain in low back and B hips to less than or equal to 4/10 at worst, 1/10 average to improve sleep with 6 hours each night. 2.  Patient will demonstrate increased lumbar AROM by >10 degrees each motion pain free to improve bending and stooping tasks. 3.  Reduce palpable muscle spasms and trigger points in lumbosacral region by at least 50% to improve overall tissue extensibility.        Long Term Goals: Time Frame: 8 weeks    1. Patient will demonstrate improved core strength to good technique for ab brace and be able to maintain brace in standing position for safety with lifting. 2.  Increase B hip strength to greater than or equal to 4+/5 and be able to perform all functional standing tasks and stair climbing in the home and community. 3.  Patient will be indep with comprehensive HEP. 4.  Decreased disability on Back Index to less than 25% disability to improve spine mobility with bathing, dressing, and housework as well as appropriate work tasks. Patient Education:   [x]  HEP/Education Completed: Plan of Care, Goals, HEP compliance, importance of strengthening in presence of OA, logroll technique. [employer]. Access Code: [email or web address]  []  No new Education completed  []  Reviewed Prior HEP      [x]  Patient verbalized and/or demonstrated understanding of education provided. []  Patient unable to verbalize and/or demonstrate understanding of education provided. Will continue education. []  Barriers to learning:     PLAN:  Treatment Recommendations: Strengthening, Range of Motion, Functional Mobility Training, Endurance Training, Neuromuscular Re-education, Manual Therapy - Soft Tissue Mobilization, Pain Management, Home Exercise Program, Patient Education, Positioning, Integrative Dry Needling, Aquatics and Modalities    [x]  Plan of care initiated. Plan to see patient 2 times per week for 8 weeks (15 visits approved) to address the treatment plan outlined above.   []  Continue with current plan of care  []  Modify plan of care as follows:    []  Hold pending physician visit  []  Discharge    Time In 0846   Time Out 0945   Timed Code Minutes: 25 min   Total Treatment Time: 59 min       Electronically Signed by: Candace Mistry

## 2021-03-10 ENCOUNTER — HOSPITAL ENCOUNTER (OUTPATIENT)
Dept: PHYSICAL THERAPY | Age: 61
Setting detail: THERAPIES SERIES
Discharge: HOME | End: 2021-03-10
Payer: OTHER GOVERNMENT

## 2021-03-10 PROCEDURE — 97110 THERAPEUTIC EXERCISES: CPT

## 2021-03-10 NOTE — PROGRESS NOTES
[address] [facility]  PHYSICAL THERAPY  [] EVALUATION  [x] DAILY NOTE (LAND) [] DAILY NOTE (AQUATIC ) [] PROGRESS NOTE [] DISCHARGE NOTE    [x] OUTPATIENT REHABILITATION CENTER [facility]   [] [place] [address]    [] [facility]   [] S[facility]    Date: 3/10/2021  Patient Name:  Naren Nurse  : 1960  MRN: [id number]  CSN: [id number]    Referring Practitioner Guanaco Torrez DO   Diagnosis Low back pain [M54.5]    Treatment Diagnosis Low back pain, B hip pain, stiffness in spine and hips, muscle weakness, difficulty walking, poor posture   Date of Evaluation 3/4/21    Additional Pertinent History Multiple MI (most recent 2019) with 3 stents, HTN, a-fib, emphysema with SOB, anxiety      Functional Outcome Measure Used Back Index   Functional Outcome Score 54% disability (3/4/21)       Insurance: Primary: Payor: 'S ADMINISTRATION /  /  / ,   Secondary:    Authorization Information: No precert; aquatics, modalities are covered, no massage   Visit # 2, 2/10 for progress note   Visits Allowed: 15 visits   Recertification Date:    Physician Follow-Up: May 2021? Physician Orders:    History of Present Illness: Patient presents with chronic low back pain. Patient reports that 20-25 years ago he was moving a refrigerator and \"put my back out\" resulting in injury to L5. Patient went to chiropractor extensively for 2.5 years with symptoms eventually subsiding. Then 5-6 years ago, he began having pain in the low back and bilateral hips. He reports recent imaging revealed presence of arthritis in both hips. Patient takes hydrocodone daily to manage this but would like to reduce usage of pain medication. Patient reports use of heating pad seems to help as well. Patient reports that once in a great while he will experience shooting pain into LLE. Patient denies presence of numbness or tingling in BLE. Negative valsalva.       SUBJECTIVE: Patient states that after his evaluation he was more stiff the next day. He reports that his pain is a 6-7/10 today in both of his hip and lower back. He states that he feels the pain more in his hips overall today. He reports compliance with his HEP. TREATMENT   Precautions: Multiple MI with stents, HTN   Pain: 6-7/10 central low back, B hips    X in shaded column indicates activity completed today   Modalities Parameters/  Location  Notes   Moist Heat   May initiate during supine strengthening               Manual Therapy Time/Technique  Notes                     Exercise/Intervention   Notes   Piriformis & Hamstring Stretch 2B each way 20 seconds X                  Ab Brace 10 3 seconds X    Ab Brace March 10B  X    Ab Brace SAQ 10B 3 seconds X    Ab Brace ball squeeze 10B 3 seconds X    Bridge 10 3 seconds X    Clamshell, reverse clamshell 10x each B 3 seconds  X                  Seated on grey thera-disc:        Seated Ab Brace on [place] thera-disc 10x  3 seconds  X    \"  \" march, LAQ 10x each B   X    Hip abduction/adduction with ball and band 10x each  3 seconds X Benson band with hip abduction. Performed bilaterally and unilaterally. Rows, shoulder extension with Theraband       Hydrohip       Hydrostick         Specific Interventions Next Treatment: Start on land with core stabilization (abdominal bracing series) and hip strengthening in all positions but if it is not well tolerated, may consider aquatics to [facility] the joints to allow ease of strengthening. Activity/Treatment Tolerance:  [x]  Patient tolerated treatment well  []  Patient limited by fatigue  []  Patient limited by pain   []  Patient limited by medical complications  []  Other:     Assessment: Added therapeutic exercises as documented above. Patient required cues on proper technique with exercises to ensure maximal muscle activation. Occasional cues provided for abdominal bracing while performing exercises.  Patient slightly unsteady while performing LAQ Home Exercise Program, Patient Education, Positioning, Integrative Dry Needling, Aquatics and Modalities    []  Plan of care initiated. Plan to see patient 2 times per week for 8 weeks (15 visits approved) to address the treatment plan outlined above.   [x]  Continue with current plan of care  []  Modify plan of care as follows:    []  Hold pending physician visit  []  Discharge    Time In 0900   Time Out 0939   Timed Code Minutes: 39 min   Total Treatment Time:  39 min       Electronically Signed by: Ramandeep Blanco

## 2021-03-12 ENCOUNTER — HOSPITAL ENCOUNTER (OUTPATIENT)
Dept: PHYSICAL THERAPY | Age: 61
Setting detail: THERAPIES SERIES
Discharge: HOME | End: 2021-03-12
Payer: OTHER GOVERNMENT

## 2021-03-12 PROCEDURE — 97110 THERAPEUTIC EXERCISES: CPT

## 2021-03-12 NOTE — PROGRESS NOTES
sometimes use heat to the lower back. States he didn't do any of his exercises yesterday because he was hurting too bad. Patient hasn't slept well the past few nights because he can't get comfortable. TREATMENT   Precautions: Multiple MI with stents, HTN   Pain: 8-9/10 Central low back, Bilteral hips    X in shaded column indicates activity completed today   Modalities Parameters/  Location  Notes   Moist Heat to lower back  X Concurrent with supine exercises               Manual Therapy Time/Technique  Notes                     Exercise/Intervention   Notes   Piriformis & Hamstring Stretch 3x Bilateral each way 20 seconds X                  Abdominal Brace 10x 5 seconds X    Abdominal Brace March 10x bilateral  X    Abdominal Brace SAQ 10x bilateral 5 seconds X    Abdominal Brace ball squeeze 10x bilateral 5 seconds X    Bridge 10x 3 seconds X    Clamshell, reverse clamshell 10x each Bilateral 3 seconds  X                  Seated on grey thera-disc:        Abdominal Brace 10x  3 seconds  X    March, LAQ 10x each Bilateral  X    Hip abduction/adduction with ball and band 10x each  3 seconds X Orange band with hip abduction. Performed bilaterally and unilaterally. Rows, shoulder extension with Theraband 10x Orange  X                  Hydrohip       Hydrostick         Specific Interventions Next Treatment: Start on land with core stabilization (abdominal bracing series) and hip strengthening in all positions but if it is not well tolerated, may consider aquatics to [place] the joints to allow ease of strengthening. Activity/Treatment Tolerance:  [x]  Patient tolerated treatment well  []  Patient limited by fatigue  []  Patient limited by pain   []  Patient limited by medical complications  []  Other:     Assessment:  Added heat while completing supine exercises today assisted with decreasing pain to 7-8/10 by end of session. No increased pain noted with exercises.  Patient did need occasional cues for abdominal bracing. Mild unsteadiness noted while sitting on grey disc. GOALS:  Patient Goal: Get some mobility back and not hurt doing it. Short Term Goals:  Time Frame: 4 weeks    1. Patient will report reduction of pain in low back and B hips to less than or equal to 4/10 at worst, 1/10 average to improve sleep with 6 hours each night. 2.  Patient will demonstrate increased lumbar AROM by >10 degrees each motion pain free to improve bending and stooping tasks. 3.  Reduce palpable muscle spasms and trigger points in lumbosacral region by at least 50% to improve overall tissue extensibility. Long Term Goals:  Time Frame: 8 weeks    1. Patient will demonstrate improved core strength to good technique for ab brace and be able to maintain brace in standing position for safety with lifting. 2.  Increase B hip strength to greater than or equal to 4+/5 and be able to perform all functional standing tasks and stair climbing in the home and community. 3.  Patient will be indep with comprehensive HEP. 4.  Decreased disability on Back Index to less than 25% disability to improve spine mobility with bathing, dressing, and housework as well as appropriate work tasks. Patient Education:   [x]  HEP/Education Completed: Continue with HEP, use of heat   Medbridge Access Code: [email or web address]  []  No new Education completed  []  Reviewed Prior HEP      [x]  Patient verbalized and/or demonstrated understanding of education provided. []  Patient unable to verbalize and/or demonstrate understanding of education provided. Will continue education. []  Barriers to learning:     PLAN:  Treatment Recommendations: Strengthening, Range of Motion, Functional Mobility Training, Endurance Training, Neuromuscular Re-education, Manual Therapy - Soft Tissue Mobilization, Pain Management, Home Exercise Program, Patient Education, Positioning, Integrative Dry Needling, Aquatics and Modalities    []  Plan of care initiated. Plan to see patient 2 times per week for 8 weeks (15 visits approved) to address the treatment plan outlined above.   [x]  Continue with current plan of care  []  Modify plan of care as follows:    []  Hold pending physician visit  []  Discharge    Time In 0915   Time Out 1000   Timed Code Minutes: 45 min   Total Treatment Time:  45 min       Electronically Signed by: Wilian Tapia

## 2021-03-16 ENCOUNTER — HOSPITAL ENCOUNTER (OUTPATIENT)
Dept: PHYSICAL THERAPY | Age: 61
Setting detail: THERAPIES SERIES
Discharge: HOME | End: 2021-03-16
Payer: OTHER GOVERNMENT

## 2021-03-16 PROCEDURE — 97110 THERAPEUTIC EXERCISES: CPT

## 2021-03-16 NOTE — PROGRESS NOTES
so he didn't do the exercises on Sunday, but otherwise doing the exercises daily. States he usually feels good for a few hours after therapy. TREATMENT   Precautions: Multiple MI with stents, HTN   Pain: 5-6/10 Central low back to the Left a little, Bilteral hips    X in shaded column indicates activity completed today   Modalities Parameters/  Location  Notes   Moist Heat to lower back   Concurrent with supine exercises               Manual Therapy Time/Technique  Notes                     Exercise/Intervention   Notes   Piriformis & Hamstring Stretch 3x Bilateral each way 20 seconds X                  Abdominal Brace 10x 5 seconds X    Abdominal Brace March 10x bilateral  X    Abdominal Brace SAQ 10x bilateral 5 seconds X    Abdominal Brace ball squeeze 10x bilateral 5 seconds X    Bridge 10x 3 seconds X    Clamshell, reverse clamshell 15x each Bilateral 3 seconds  X                  Seated on grey thera-disc:        Abdominal Brace 10x  3 seconds  X    March, LAQ 10x each Bilateral  X    Hip abduction/adduction with ball and band 15x each  3 seconds X Orange band with hip abduction. Performed bilaterally and unilaterally. Rows, shoulder extension,  with Theraband 15x Orange  X           HydroStick forward/back, side to side 10x  X Cues for bracing   Hydrohip                Specific Interventions Next Treatment: Start on land with core stabilization (abdominal bracing series) and hip strengthening in all positions but if it is not well tolerated, may consider aquatics to [facility] the joints to allow ease of strengthening. Activity/Treatment Tolerance:  [x]  Patient tolerated treatment well  []  Patient limited by fatigue  []  Patient limited by pain   []  Patient limited by medical complications  []  Other:     Assessment:  Withheld heat today and patient tolerated well. Progressed activities with increased repetitions and added hydrostick today.  Cues given for abdominal bracing with activities seated on grey disc. GOALS:  Patient Goal: Get some mobility back and not hurt doing it. Short Term Goals:  Time Frame: 4 weeks    1. Patient will report reduction of pain in low back and B hips to less than or equal to 4/10 at worst, 1/10 average to improve sleep with 6 hours each night. 2.  Patient will demonstrate increased lumbar AROM by >10 degrees each motion pain free to improve bending and stooping tasks. 3.  Reduce palpable muscle spasms and trigger points in lumbosacral region by at least 50% to improve overall tissue extensibility. Long Term Goals:  Time Frame: 8 weeks    1. Patient will demonstrate improved core strength to good technique for ab brace and be able to maintain brace in standing position for safety with lifting. 2.  Increase B hip strength to greater than or equal to 4+/5 and be able to perform all functional standing tasks and stair climbing in the home and community. 3.  Patient will be indep with comprehensive HEP. 4.  Decreased disability on Back Index to less than 25% disability to improve spine mobility with bathing, dressing, and housework as well as appropriate work tasks. Patient Education:   []  HEP/Education Completed: Continue with HEP, use of heat   Medbridge Access Code: [email or web address]  []  No new Education completed  [x]  Reviewed Prior HEP      [x]  Patient verbalized and/or demonstrated understanding of education provided. []  Patient unable to verbalize and/or demonstrate understanding of education provided. Will continue education. []  Barriers to learning:     PLAN:  Treatment Recommendations: Strengthening, Range of Motion, Functional Mobility Training, Endurance Training, Neuromuscular Re-education, Manual Therapy - Soft Tissue Mobilization, Pain Management, Home Exercise Program, Patient Education, Positioning, Integrative Dry Needling, Aquatics and Modalities    []  Plan of care initiated.   Plan to see patient 2 times per week for 8 weeks (15 visits approved) to address the treatment plan outlined above.   [x]  Continue with current plan of care  []  Modify plan of care as follows:    []  Hold pending physician visit  []  Discharge    Time In 0900   Time Out 0945   Timed Code Minutes: 45 min   Total Treatment Time:  45 min       Electronically Signed by: Kimberlyn Tee

## 2021-03-19 ENCOUNTER — HOSPITAL ENCOUNTER (OUTPATIENT)
Dept: PHYSICAL THERAPY | Age: 61
Setting detail: THERAPIES SERIES
Discharge: HOME | End: 2021-03-19
Payer: OTHER GOVERNMENT

## 2021-03-19 PROCEDURE — 97110 THERAPEUTIC EXERCISES: CPT

## 2021-03-19 NOTE — PROGRESS NOTES
[address] [facility]  PHYSICAL THERAPY  [] EVALUATION  [x] DAILY NOTE (LAND) [] DAILY NOTE (AQUATIC ) [] PROGRESS NOTE [] DISCHARGE NOTE    [x] OUTPATIENT REHABILITATION CENTER [facility]   [] [facility]    [] [facility]   [] Yoli Hurley    Date: 3/19/2021  Patient Name:  Fox Pinedo  : 1960  MRN: [id number]  CSN: [id number]    Referring Practitioner Megan Rivas DO   Diagnosis Low back pain [M54.5]    Treatment Diagnosis Low back pain, B hip pain, stiffness in spine and hips, muscle weakness, difficulty walking, poor posture   Date of Evaluation 3/4/21    Additional Pertinent History Multiple MI (most recent 2019) with 3 stents, HTN, a-fib, emphysema with SOB, anxiety      Functional Outcome Measure Used Back Index   Functional Outcome Score 54% disability (3/4/21)       Insurance: Primary: Payor: Patty Jordan /  /  / ,   Secondary:    Authorization Information: No precert; aquatics, modalities are covered, no massage   Visit # 5, 10 for progress note   Visits Allowed: 15 visits   Recertification Date: 48   Physician Follow-Up: May 2021? Physician Orders:    History of Present Illness: Patient presents with chronic low back pain. Patient reports that 20-25 years ago he was moving a refrigerator and \"put my back out\" resulting in injury to L5. Patient went to chiropractor extensively for 2.5 years with symptoms eventually subsiding. Then 5-6 years ago, he began having pain in the low back and bilateral hips. He reports recent imaging revealed presence of arthritis in both hips. Patient takes hydrocodone daily to manage this but would like to reduce usage of pain medication. Patient reports use of heating pad seems to help as well. Patient reports that once in a great while he will experience shooting pain into LLE. Patient denies presence of numbness or tingling in BLE. Negative valsalva.       SUBJECTIVE:  Patient reports he is having an off day due to his atrial fibrillation \"acting up. \" Patient reports he wants to continue with PT and will tell therapist if he needs to stop or modify things. Patient does report this happens from time to time without warning or precipitating activity and he deferred need for ED visit. Pain and stiffness is elevated in bilateral hips today which patient attributes to cooler, damp weather outside. Since starting PT, he notes increased energy and feels more limber. TREATMENT   Precautions: Multiple MI with stents, HTN   Pain: 7/10 Bilteral hips    X in shaded column indicates activity completed today   Modalities Parameters/  Location  Notes   Moist Heat to lower back   Concurrent with supine exercises               Manual Therapy Time/Technique  Notes                     Exercise/Intervention   Notes   Piriformis & Hamstring Stretch 3x Bilateral each way 20 seconds X                  Abdominal Brace 15x 5 seconds X    Abdominal Brace March 15x bilateral  X    Abdominal Brace SAQ 15x bilateral 5 seconds X    Abdominal Brace ball squeeze 15x bilateral 5 seconds X    Bridge 10x 3 seconds X    Clamshell, reverse clamshell 15x each Bilateral 3 seconds  X                  Seated on grey thera-disc:        Abdominal Brace 10x  3 seconds  X    March, LAQ 10x each Bilateral  X    Hip abduction/adduction with ball and band 15x each  3 seconds X Orange band with hip abduction. Performed bilaterally and unilaterally. Rows, shoulder extension,  with Theraband 15x Orange  X           HydroStick forward/back, side to side 10x  X Cues for bracing   Hydrohip       NuStep         Specific Interventions Next Treatment: Start on land with core stabilization (abdominal bracing series) and hip strengthening in all positions but if it is not well tolerated, may consider aquatics to [facility] the joints to allow ease of strengthening.       Activity/Treatment Tolerance:  [x]  Patient tolerated treatment well  []  Patient limited by Reviewed Prior HEP      [x]  Patient verbalized and/or demonstrated understanding of education provided. []  Patient unable to verbalize and/or demonstrate understanding of education provided. Will continue education. []  Barriers to learning:     PLAN:  Treatment Recommendations: Strengthening, Range of Motion, Functional Mobility Training, Endurance Training, Neuromuscular Re-education, Manual Therapy - Soft Tissue Mobilization, Pain Management, Home Exercise Program, Patient Education, Positioning, Integrative Dry Needling, Aquatics and Modalities    []  Plan of care initiated. Plan to see patient 2 times per week for 8 weeks (15 visits approved) to address the treatment plan outlined above.   [x]  Continue with current plan of care  []  Modify plan of care as follows:    []  Hold pending physician visit  []  Discharge    Time In 0932   Time Out 1013   Timed Code Minutes: 41 min   Total Treatment Time:  41 min       Electronically Signed by: Mane Schwartz

## 2021-03-22 ENCOUNTER — HOSPITAL ENCOUNTER (OUTPATIENT)
Dept: PHYSICAL THERAPY | Age: 61
Setting detail: THERAPIES SERIES
Discharge: HOME | End: 2021-03-22
Payer: OTHER GOVERNMENT

## 2021-03-22 PROCEDURE — 97110 THERAPEUTIC EXERCISES: CPT

## 2021-03-22 NOTE — PROGRESS NOTES
[address] [facility]  PHYSICAL THERAPY  [] EVALUATION  [x] DAILY NOTE (LAND) [] DAILY NOTE (AQUATIC ) [] PROGRESS NOTE [] DISCHARGE NOTE    [x] OUTPATIENT REHABILITATION CENTER [facility]   [] [facility]     [] [facility]   [] Dale Mckeon    Date: 3/22/2021  Patient Name:  Aaron Davenport  : 1960  MRN: [id number]  CSN: [id number]    Referring Practitioner Dalia Correa DO   Diagnosis Low back pain [M54.5]    Treatment Diagnosis Low back pain, B hip pain, stiffness in spine and hips, muscle weakness, difficulty walking, poor posture   Date of Evaluation 3/4/21    Additional Pertinent History Multiple MI (most recent 2019) with 3 stents, HTN, a-fib, emphysema with SOB, anxiety      Functional Outcome Measure Used Back Index   Functional Outcome Score 54% disability (3/4/21)       Insurance: Primary: Payor: Radha Phillips /  /  / ,   Secondary:    Authorization Information: No precert; aquatics, modalities are covered, no massage   Visit # 6, 6/10 for progress note   Visits Allowed: 15 visits   Recertification Date:    Physician Follow-Up: May 2021? Physician Orders:    History of Present Illness: Patient presents with chronic low back pain. Patient reports that 20-25 years ago he was moving a refrigerator and \"put my back out\" resulting in injury to L5. Patient went to chiropractor extensively for 2.5 years with symptoms eventually subsiding. Then 5-6 years ago, he began having pain in the low back and bilateral hips. He reports recent imaging revealed presence of arthritis in both hips. Patient takes hydrocodone daily to manage this but would like to reduce usage of pain medication. Patient reports use of heating pad seems to help as well. Patient reports that once in a great while he will experience shooting pain into LLE. Patient denies presence of numbness or tingling in BLE. Negative valsalva.       SUBJECTIVE:  Patient presents today reporting his pain is lower compared to last visit and the a-fib symptoms have resolved over the weekend. Patient did increased walking while fishing this past weekend and it was well tolerated. Patient reports he experienced a strong L hamstring cramp yesterday while doing HS stretching and it still feels tight today. TREATMENT   Precautions: Multiple MI with stents, HTN   Pain: 5/10 Bilteral hips    X in shaded column indicates activity completed today   Modalities Parameters/  Location  Notes   Moist Heat to lower back   Concurrent with supine exercises               Manual Therapy Time/Technique  Notes                     Exercise/Intervention   Notes   Piriformis & Hamstring Stretch 3x Bilateral each way 20 seconds X                  Abdominal Brace 15x 5 seconds X    Abdominal Brace March 15x bilateral  X    Abdominal Brace SAQ 15x bilateral 5 seconds X    Abdominal Brace ball squeeze 15x bilateral 5 seconds X    Abdominal Brace SLR 10x bilateral  X    Bridge 15x 3 seconds X    Clamshell, reverse clamshell 15x each Bilateral 3 seconds  X                  Seated on grey thera-disc:        Abdominal Brace 10x  3 seconds  X    March, LAQ 10x each Bilateral  X    Hip abduction/adduction with ball and band 15x each  3 seconds X Orange band with hip abduction. Performed bilaterally and unilaterally. Rows, shoulder extension,  with Theraband 15x Orange  X           HydroStick forward/back, side to side 10x  X Cues for bracing   Hydrohip       NuStep 5 minutes Level 2 X Seat 7, Arms 8   Standing 3-way hip 10 bilateral  X Abduction and flexion; BUE support     Specific Interventions Next Treatment: Start on land with core stabilization (abdominal bracing series) and hip strengthening in all positions but if it is not well tolerated, may consider aquatics to [facility] the joints to allow ease of strengthening.       Activity/Treatment Tolerance:  [x]  Patient tolerated treatment well  []  Patient limited by fatigue  []  Patient limited by pain   []  Patient limited by medical complications  []  Other:     Assessment:  Progressed patient's program today with addition of ab brace SLR, increased repetitions for bridging, and addition of NuStep and standing hip abduction with UE support. Although patient was fatigued at conclusion of visit, overall, interventions were well tolerated without increasing pain. Patient is making steady progress at this time but benefits from ongoing transition to standing only program for functional carryover. GOALS:  Patient Goal: Get some mobility back and not hurt doing it. Short Term Goals:  Time Frame: 4 weeks    1. Patient will report reduction of pain in low back and B hips to less than or equal to 4/10 at worst, 1/10 average to improve sleep with 6 hours each night. 2.  Patient will demonstrate increased lumbar AROM by >10 degrees each motion pain free to improve bending and stooping tasks. 3.  Reduce palpable muscle spasms and trigger points in lumbosacral region by at least 50% to improve overall tissue extensibility. Long Term Goals:  Time Frame: 8 weeks    1. Patient will demonstrate improved core strength to good technique for ab brace and be able to maintain brace in standing position for safety with lifting. 2.  Increase B hip strength to greater than or equal to 4+/5 and be able to perform all functional standing tasks and stair climbing in the home and community. 3.  Patient will be indep with comprehensive HEP. 4.  Decreased disability on Back Index to less than 25% disability to improve spine mobility with bathing, dressing, and housework as well as appropriate work tasks. Patient Education:   []  HEP/Education Completed: Continue with HEP   Junar Access Code: [email or web address]  []  No new Education completed  [x]  Reviewed Prior HEP      [x]  Patient verbalized and/or demonstrated understanding of education provided.   []  Patient unable to verbalize and/or demonstrate understanding of education provided. Will continue education. []  Barriers to learning:     PLAN:  Treatment Recommendations: Strengthening, Range of Motion, Functional Mobility Training, Endurance Training, Neuromuscular Re-education, Manual Therapy - Soft Tissue Mobilization, Pain Management, Home Exercise Program, Patient Education, Positioning, Integrative Dry Needling, Aquatics and Modalities    []  Plan of care initiated. Plan to see patient 2 times per week for 8 weeks (15 visits approved) to address the treatment plan outlined above.   [x]  Continue with current plan of care  []  Modify plan of care as follows:    []  Hold pending physician visit  []  Discharge    Time In 0932   Time Out 1012   Timed Code Minutes: 40 min   Total Treatment Time:  40 min       Electronically Signed by: Elvis Bowles

## 2021-03-25 ENCOUNTER — HOSPITAL ENCOUNTER (OUTPATIENT)
Dept: PHYSICAL THERAPY | Age: 61
Setting detail: THERAPIES SERIES
Discharge: HOME | End: 2021-03-25
Payer: OTHER GOVERNMENT

## 2021-03-25 PROCEDURE — 97110 THERAPEUTIC EXERCISES: CPT

## 2021-03-25 NOTE — PROGRESS NOTES
[address] [facility]  PHYSICAL THERAPY  [] EVALUATION  [x] DAILY NOTE (LAND) [] DAILY NOTE (AQUATIC ) [] PROGRESS NOTE [] DISCHARGE NOTE    [x] OUTPATIENT REHABILITATION CENTER [facility]   [] [place] [address]    [] [facility]   [] Gonzalez Mccrary    Date: 3/25/2021  Patient Name:  Quentin Ramírez  : 1960  MRN: [id number]  CSN: [id number]    Referring Practitioner Fredric Lesches, DO   Diagnosis Low back pain [M54.5]    Treatment Diagnosis Low back pain, B hip pain, stiffness in spine and hips, muscle weakness, difficulty walking, poor posture   Date of Evaluation 3/4/21    Additional Pertinent History Multiple MI (most recent 2019) with 3 stents, HTN, a-fib, emphysema with SOB, anxiety      Functional Outcome Measure Used Back Index   Functional Outcome Score 54% disability (3/4/21)       Insurance: Primary: Payor: Nuno Samuel /  /  / ,   Secondary:    Authorization Information: No precert; aquatics, modalities are covered, no massage   Visit # 7, 7/10 for progress note   Visits Allowed: 15 visits   Recertification Date:    Physician Follow-Up: May 2021? Physician Orders:    History of Present Illness: Patient presents with chronic low back pain. Patient reports that 20-25 years ago he was moving a refrigerator and \"put my back out\" resulting in injury to L5. Patient went to chiropractor extensively for 2.5 years with symptoms eventually subsiding. Then 5-6 years ago, he began having pain in the low back and bilateral hips. He reports recent imaging revealed presence of arthritis in both hips. Patient takes hydrocodone daily to manage this but would like to reduce usage of pain medication. Patient reports use of heating pad seems to help as well. Patient reports that once in a great while he will experience shooting pain into LLE. Patient denies presence of numbness or tingling in BLE. Negative valsalva.       SUBJECTIVE:  Patient notes he almost cancelled today due to upset stomach that began last night. Patient notes hip pain is 5/10. TREATMENT   Precautions: Multiple MI with stents, HTN   Pain: 5/10 Bilteral hips    X in shaded column indicates activity completed today   Modalities Parameters/  Location  Notes   Moist Heat to lower back   Concurrent with supine exercises               Manual Therapy Time/Technique  Notes                     Exercise/Intervention   Notes   Piriformis & Hamstring Stretch 3x Bilateral each way 20 seconds                   Abdominal Brace 15x 5 seconds     Abdominal Brace March 15x bilateral      Abdominal Brace SAQ 15x bilateral 5 seconds     Abdominal Brace ball squeeze 15x bilateral 5 seconds     Abdominal Brace SLR 10x bilateral      Bridge 15x 3 seconds     Clamshell, reverse clamshell 15x each Bilateral 3 seconds                    Seated on grey thera-disc:        Abdominal Brace 10x  3 seconds  X    March, LAQ 10x each Bilateral  X    Hip abduction/adduction with ball and band 15x each  3 seconds X Orange band with hip abduction. Performed bilaterally and unilaterally. Rows, shoulder extension,  with Theraband 15x Orange  X           HydroStick forward/back, side to side 10x  X Cues for bracing   Hydrohip Bilateral and single x15  ew  X Level 3 B, Level 2 single    NuStep 5 minutes Level 2 X Seat 7, Arms 8   Standing 3-way hip 10 bilateral  X Abduction and flexion; BUE support     Specific Interventions Next Treatment: Start on land with core stabilization (abdominal bracing series) and hip strengthening in all positions but if it is not well tolerated, may consider aquatics to [place] the joints to allow ease of strengthening.       Activity/Treatment Tolerance:  [x]  Patient tolerated treatment well  []  Patient limited by fatigue  []  Patient limited by pain   []  Patient limited by medical complications  []  Other:     Assessment:  Patient presents today with symptoms of upset stomach so held on supine exercises and focused on seated and standing core stabilization. Patient tolerates well but does require more frequent seated rest breaks. GOALS:  Patient Goal: Get some mobility back and not hurt doing it. Short Term Goals:  Time Frame: 4 weeks    1. Patient will report reduction of pain in low back and B hips to less than or equal to 4/10 at worst, 1/10 average to improve sleep with 6 hours each night. 2.  Patient will demonstrate increased lumbar AROM by >10 degrees each motion pain free to improve bending and stooping tasks. 3.  Reduce palpable muscle spasms and trigger points in lumbosacral region by at least 50% to improve overall tissue extensibility. Long Term Goals:  Time Frame: 8 weeks    1. Patient will demonstrate improved core strength to good technique for ab brace and be able to maintain brace in standing position for safety with lifting. 2.  Increase B hip strength to greater than or equal to 4+/5 and be able to perform all functional standing tasks and stair climbing in the home and community. 3.  Patient will be indep with comprehensive HEP. 4.  Decreased disability on Back Index to less than 25% disability to improve spine mobility with bathing, dressing, and housework as well as appropriate work tasks. Patient Education:   []  HEP/Education Completed: Continue with HEP   [employer] Access Code: [email or web address]  []  No new Education completed  [x]  Reviewed Prior HEP      [x]  Patient verbalized and/or demonstrated understanding of education provided. []  Patient unable to verbalize and/or demonstrate understanding of education provided. Will continue education.   []  Barriers to learning:     PLAN:  Treatment Recommendations: Strengthening, Range of Motion, Functional Mobility Training, Endurance Training, Neuromuscular Re-education, Manual Therapy - Soft Tissue Mobilization, Pain Management, Home Exercise Program, Patient Education, Positioning, Integrative Dry Needling, Aquatics and

## 2021-03-29 ENCOUNTER — HOSPITAL ENCOUNTER (OUTPATIENT)
Dept: PHYSICAL THERAPY | Age: 61
Setting detail: THERAPIES SERIES
Discharge: HOME | End: 2021-03-29
Payer: OTHER GOVERNMENT

## 2021-03-29 PROCEDURE — 97110 THERAPEUTIC EXERCISES: CPT

## 2021-03-29 NOTE — PROGRESS NOTES
[address] [facility]  PHYSICAL THERAPY  [] EVALUATION  [x] DAILY NOTE (LAND) [] DAILY NOTE (AQUATIC ) [] PROGRESS NOTE [] DISCHARGE NOTE    [x] OUTPATIENT REHABILITATION CENTER [facility]   [] [place] [address]    [] [facility]   [] Irasema Hammonds    Date: 3/29/2021  Patient Name:  Cristy Sunshine  : 1960  MRN: [id number]  CSN: [id number]    Referring Practitioner Roland Valenzuela DO   Diagnosis Low back pain [M54.5]    Treatment Diagnosis Low back pain, B hip pain, stiffness in spine and hips, muscle weakness, difficulty walking, poor posture   Date of Evaluation 3/4/21    Additional Pertinent History Multiple MI (most recent 2019) with 3 stents, HTN, a-fib, emphysema with SOB, anxiety      Functional Outcome Measure Used Back Index   Functional Outcome Score 54% disability (3/4/21)       Insurance: Primary: Payor: Shraddha Ren /  /  / ,   Secondary:    Authorization Information: No precert; aquatics, modalities are covered, no massage   Visit # 8, 8/10 for progress note   Visits Allowed: 15 visits   Recertification Date:    Physician Follow-Up: May 2021? Physician Orders:    History of Present Illness: Patient presents with chronic low back pain. Patient reports that 20-25 years ago he was moving a refrigerator and \"put my back out\" resulting in injury to L5. Patient went to chiropractor extensively for 2.5 years with symptoms eventually subsiding. Then 5-6 years ago, he began having pain in the low back and bilateral hips. He reports recent imaging revealed presence of arthritis in both hips. Patient takes hydrocodone daily to manage this but would like to reduce usage of pain medication. Patient reports use of heating pad seems to help as well. Patient reports that once in a great while he will experience shooting pain into LLE. Patient denies presence of numbness or tingling in BLE. Negative valsalva.       SUBJECTIVE:  Patient notes he is feeling better in regards to his stomach. Patient notes increased hip and low back pain today and is unsure of why. TREATMENT   Precautions: Multiple MI with stents, HTN   Pain: 7/10 Bilteral hips    X in shaded column indicates activity completed today   Modalities Parameters/  Location  Notes   Moist Heat to lower back   Concurrent with supine exercises               Manual Therapy Time/Technique  Notes                     Exercise/Intervention   Notes   Piriformis & Hamstring Stretch 3x Bilateral each way 20 seconds X                  Abdominal Brace 15x 5 seconds     Abdominal Brace March 15x bilateral      Abdominal Brace SAQ 15x bilateral 5 seconds     Abdominal Brace ball squeeze 15x bilateral 5 seconds     Abdominal Brace SLR 10x bilateral      Bridge 15x 3 seconds     Clamshell, reverse clamshell 15x each Bilateral 3 seconds                    Seated on grey thera-disc:        Abdominal Brace 10x  3 seconds  X    March, LAQ 10x each Bilateral  X    Hip abduction/adduction with ball and band 15x each  3 seconds X Orange band with hip abduction. Performed bilaterally and unilaterally. Rows, shoulder extension,  with Theraband 15x Orange  X    PACE chest press  2x15   X Level    HydroStick forward/back, side to side 10x  X Cues for bracing   Hydrohip Bilateral and single x20 B, x10 U  X Level 3 B, Level 2 single    NuStep 5 minutes Level 3 X Seat 7, Arms 8   Standing 3-way hip 10 bilateral  X Abduction and flexion; BUE support     Specific Interventions Next Treatment: Start on land with core stabilization (abdominal bracing series) and hip strengthening in all positions but if it is not well tolerated, may consider aquatics to [place] the joints to allow ease of strengthening.       Activity/Treatment Tolerance:  [x]  Patient tolerated treatment well  []  Patient limited by fatigue  []  Patient limited by pain   []  Patient limited by medical complications  []  Other:     Assessment:  Patient is challenged with today's treatment session and exercise progressions from a strengthening standpoint. Concluded treatment session with LE flexibility exercises to promote decreased hip pain. Patient rates pain at conclusion of treatment session 5/10 as compared to 7/10. GOALS:  Patient Goal: Get some mobility back and not hurt doing it. Short Term Goals:  Time Frame: 4 weeks    1. Patient will report reduction of pain in low back and B hips to less than or equal to 4/10 at worst, 1/10 average to improve sleep with 6 hours each night. 2.  Patient will demonstrate increased lumbar AROM by >10 degrees each motion pain free to improve bending and stooping tasks. 3.  Reduce palpable muscle spasms and trigger points in lumbosacral region by at least 50% to improve overall tissue extensibility. Long Term Goals:  Time Frame: 8 weeks    1. Patient will demonstrate improved core strength to good technique for ab brace and be able to maintain brace in standing position for safety with lifting. 2.  Increase B hip strength to greater than or equal to 4+/5 and be able to perform all functional standing tasks and stair climbing in the home and community. 3.  Patient will be indep with comprehensive HEP. 4.  Decreased disability on Back Index to less than 25% disability to improve spine mobility with bathing, dressing, and housework as well as appropriate work tasks. Patient Education:   []  HEP/Education Completed: Continue with HEP   [facility] Access Code: [email or web address]  []  No new Education completed  [x]  Reviewed Prior HEP      [x]  Patient verbalized and/or demonstrated understanding of education provided. []  Patient unable to verbalize and/or demonstrate understanding of education provided. Will continue education.   []  Barriers to learning:     PLAN:  Treatment Recommendations: Strengthening, Range of Motion, Functional Mobility Training, Endurance Training, Neuromuscular Re-education, Manual Therapy - Soft Tissue

## 2021-04-01 ENCOUNTER — HOSPITAL ENCOUNTER (OUTPATIENT)
Dept: PHYSICAL THERAPY | Age: 61
Setting detail: THERAPIES SERIES
Discharge: HOME | End: 2021-04-01
Payer: OTHER GOVERNMENT

## 2021-04-01 PROCEDURE — 97110 THERAPEUTIC EXERCISES: CPT

## 2021-04-01 NOTE — PROGRESS NOTES
[address] [facility]  PHYSICAL THERAPY  [] EVALUATION  [x] DAILY NOTE (LAND) [] DAILY NOTE (AQUATIC )  [] PROGRESS NOTE [] DISCHARGE NOTE    [x] OUTPATIENT REHABILITATION [facility]   [] [place] [address]    [] [facility]   [] Henry Rocha    Date: 2021  Patient Name:  Rain Quiroz  : 1960  MRN: [id number]  CSN: [id number]    Referring Practitioner Sridhar Syed DO   Diagnosis Low back pain [M54.5]    Treatment Diagnosis Low back pain, B hip pain, stiffness in spine and hips, muscle weakness, difficulty walking, poor posture   Date of Evaluation 3/4/21    Additional Pertinent History Multiple MI (most recent 2019) with 3 stents, HTN, a-fib, emphysema with SOB, anxiety      Functional Outcome Measure Used Back Index   Functional Outcome Score 54% disability (3/4/21)       Insurance: Primary: Payor: Damaris Juarez /  /  / ,   Secondary:    Authorization Information: No precert; aquatics, modalities are covered, no massage   Visit # 9, 9/15 for progress note   Visits Allowed: 15 visits   Recertification Date: 95   Physician Follow-Up: May 2021? Physician Orders:    History of Present Illness: Patient presents with chronic low back pain. Patient reports that 20-25 years ago he was moving a refrigerator and \"put my back out\" resulting in injury to L5. Patient went to chiropractor extensively for 2.5 years with symptoms eventually subsiding. Then 5-6 years ago, he began having pain in the low back and bilateral hips. He reports recent imaging revealed presence of arthritis in both hips. Patient takes hydrocodone daily to manage this but would like to reduce usage of pain medication. Patient reports use of heating pad seems to help as well. Patient reports that once in a great while he will experience shooting pain into LLE. Patient denies presence of numbness or tingling in BLE. Negative valsalva.       SUBJECTIVE:  Patient presents early for visit, reports his back and hip pain is \"not too bad\" this morning. Patient rates pain 4-5/10 today. Patient is agreeable to combination of supine and upright exercises as his stomach is feeling better. Patient went to a dirt track race over the weekend and was able to sit on metal bleachers but once he got home he was more stiff and sore. TREATMENT   Precautions: Multiple MI with stents, HTN   Pain: 4-5/10 Bilteral hips    X in shaded column indicates activity completed today   Modalities Parameters/  Location  Notes   Moist Heat to lower back   Concurrent with supine exercises               Manual Therapy Time/Technique  Notes                     Exercise/Intervention   Notes   Piriformis & Hamstring Stretch 3x Bilateral each way 20 seconds X                  Abdominal Brace 15x 5 seconds     Abdominal Brace March 15x bilateral      Abdominal Brace SAQ 15x bilateral 5 seconds     Abdominal Brace ball squeeze 15x bilateral 5 seconds     Abdominal Brace SLR 15x bilateral  X    Bridge 15x 3 seconds X    Clamshell, reverse clamshell 15x each Bilateral 3 seconds  X                  Seated on grey thera-disc:        Abdominal Brace 10x  3 seconds      March, LAQ 15x each Bilateral  X    Hip abduction/adduction with ball and band 20x each  3 seconds X Orange band with hip abduction. Performed bilaterally and unilaterally. Rows, shoulder extension,  with Theraband 20x Orange  X    PACE chest press  2x15   X Level 2   HydroStick forward/back, side to side 15x  X Cues for bracing, standing on balance balls   Hydrohip Bilateral and single x20 B, x10 U  X Level 4 B, Level 3 single    NuStep 5 minutes Level 3 X Seat 7, Arms 8   Standing 3-way hip 10 bilateral  X Abduction and flexion; BUE support     Specific Interventions Next Treatment: Progress core stabilization program in upright positions as much as possible. Can revisit sidelying hip strengthening as needed to check technique.        Activity/Treatment Tolerance:  [x] Patient tolerated treatment well  []  Patient limited by fatigue  []  Patient limited by pain   []  Patient limited by medical complications  []  Other:     Assessment:  Patient presents with decreased pain and was able to complete a combination of exercises in various positions today without increasing pain. Increased repetitions for seated march, LAQ, and hip abd/add sitting on the shay disc today. Patient was challenged by hydrostick exercise today due to balance deficits on unstable surface. GOALS:  Patient Goal: Get some mobility back and not hurt doing it. Short Term Goals:  Time Frame: 4 weeks    1. Patient will report reduction of pain in low back and B hips to less than or equal to 4/10 at worst, 1/10 average to improve sleep with 6 hours each night. 2.  Patient will demonstrate increased lumbar AROM by >10 degrees each motion pain free to improve bending and stooping tasks. 3.  Reduce palpable muscle spasms and trigger points in lumbosacral region by at least 50% to improve overall tissue extensibility. Long Term Goals:  Time Frame: 8 weeks    1. Patient will demonstrate improved core strength to good technique for ab brace and be able to maintain brace in standing position for safety with lifting. 2.  Increase B hip strength to greater than or equal to 4+/5 and be able to perform all functional standing tasks and stair climbing in the home and community. 3.  Patient will be indep with comprehensive HEP. 4.  Decreased disability on Back Index to less than 25% disability to improve spine mobility with bathing, dressing, and housework as well as appropriate work tasks. Patient Education:   []  HEP/Education Completed: Continue with HEP   [employer] Access Code: [email or web address]  []  No new Education completed  [x]  Reviewed Prior HEP      [x]  Patient verbalized and/or demonstrated understanding of education provided.   []  Patient unable to verbalize and/or demonstrate understanding of education provided. Will continue education. []  Barriers to learning:     PLAN:  Treatment Recommendations: Strengthening, Range of Motion, Functional Mobility Training, Endurance Training, Neuromuscular Re-education, Manual Therapy - Soft Tissue Mobilization, Pain Management, Home Exercise Program, Patient Education, Positioning, Integrative Dry Needling, Aquatics and Modalities    []  Plan of care initiated. Plan to see patient 2 times per week for 8 weeks (15 visits approved) to address the treatment plan outlined above.   [x]  Continue with current plan of care  []  Modify plan of care as follows:    []  Hold pending physician visit  []  Discharge    Time In 0750   Time Out 0830   Timed Code Minutes: 40 min   Total Treatment Time:  40 min       Electronically Signed by: Christine Dupont

## 2021-04-05 ENCOUNTER — HOSPITAL ENCOUNTER (OUTPATIENT)
Dept: PHYSICAL THERAPY | Age: 61
Setting detail: THERAPIES SERIES
Discharge: HOME | End: 2021-04-05
Payer: OTHER GOVERNMENT

## 2021-04-05 PROCEDURE — 97110 THERAPEUTIC EXERCISES: CPT

## 2021-04-05 NOTE — PROGRESS NOTES
[address] [facility]  PHYSICAL THERAPY  [] EVALUATION  [x] DAILY NOTE (LAND) [] DAILY NOTE (AQUATIC )  [] PROGRESS NOTE [] DISCHARGE NOTE    [x] OUTPATIENT REHABILITATION CENTER [facility]   [] Ciara     [] [facility]   [] Augusta Gutiérrez    Date: 2021  Patient Name:  Hipolito Reddy  : 1960  MRN: [id number]  CSN: [id number]    Referring Practitioner Milo Martinez DO   Diagnosis Low back pain [M54.5]    Treatment Diagnosis Low back pain, B hip pain, stiffness in spine and hips, muscle weakness, difficulty walking, poor posture   Date of Evaluation 3/4/21    Additional Pertinent History Multiple MI (most recent 2019) with 3 stents, HTN, a-fib, emphysema with SOB, anxiety      Functional Outcome Measure Used Back Index   Functional Outcome Score 54% disability (3/4/21)       Insurance: Primary: Payor: Connor Boss /  /  / ,   Secondary:    Authorization Information: No precert; aquatics, modalities are covered, no massage   Visit # 10, 10/15 for progress note   Visits Allowed: 15 visits   Recertification Date:    Physician Follow-Up: May 2021   Physician Orders:    History of Present Illness: Patient presents with chronic low back pain. Patient reports that 20-25 years ago he was moving a refrigerator and \"put my back out\" resulting in injury to L5. Patient went to chiropractor extensively for 2.5 years with symptoms eventually subsiding. Then 5-6 years ago, he began having pain in the low back and bilateral hips. He reports recent imaging revealed presence of arthritis in both hips. Patient takes hydrocodone daily to manage this but would like to reduce usage of pain medication. Patient reports use of heating pad seems to help as well. Patient reports that once in a great while he will experience shooting pain into LLE. Patient denies presence of numbness or tingling in BLE. Negative valsalva.       SUBJECTIVE:  Patient reports he has been feeling fairly well since last visit and rates pain this morning 4/10. Patient notes that he had increased pain Saturday morning but attributes it to sleeping position. Patient states that overall he feels 50-60% better since starting therapy with greatest ongoing deficit being in standing tolerance. Patient wishes to continue strengthening and complete remaining visits approved by [place]. TREATMENT   Precautions: Multiple MI with stents, HTN   Pain: 4/10 Bilteral hips, low back    X in shaded column indicates activity completed today   Modalities Parameters/  Location  Notes   Moist Heat to lower back   Concurrent with supine exercises               Manual Therapy Time/Technique  Notes                     Exercise/Intervention   Notes   Piriformis & Hamstring Stretch 3x Bilateral each way 20 seconds X                  Abdominal Brace 15x 5 seconds     Abdominal Brace March 15x bilateral      Abdominal Brace SAQ 15x bilateral 5 seconds     Abdominal Brace ball squeeze 15x bilateral 5 seconds     Abdominal Brace SLR 15x bilateral      Bridge 15x 3 seconds X    Clamshell, reverse clamshell 15x each Bilateral 3 seconds  X                  Seated on grey thera-disc:        Abdominal Brace 10x  3 seconds      March, LAQ 20x each Bilateral  X    Hip abduction/adduction with ball and band 20x each  3 seconds X Orange band with hip abduction. Performed bilaterally and unilaterally.     Rows, shoulder extension,  with Theraband 20x Orange  X    PACE chest press  2x15   X Level 2   HydroStick forward/back, side to side 15x  X Cues for bracing, standing on balance balls   Hydrohip Bilateral and single x20 B, x10 U  X Level 4 B   NuStep 6 minutes Level 3 X Seat 7, Arms 8   Standing 3-way hip 15 bilateral  X Abduction and flexion; BUE support   Step Ups onto BOSU 10 bilateral  forward X BUE support   Lunge onto BOSU 10 bilateral  X    Sidestepping         Specific Interventions Next Treatment: Progress core stabilization program in upright positions as much as possible. Can revisit sidelying hip strengthening as needed to check technique. Add sidestepping next visit. Activity/Treatment Tolerance:  [x]  Patient tolerated treatment well  []  Patient limited by fatigue  []  Patient limited by pain   []  Patient limited by medical complications  []  Other:     Assessment:  Patient had good response to starting visit with seated stability disc exercises with increased repetitions completed for LAQ and hip abduction today. Patient was cued during hydrostick to maintain abdominal brace and avoid placing toes down on ground for stability. Patient also increased duration on NuStep. Progressed standing hip strengthening program today with increased repetitions for 3-way hip, and added step ups onto BOSU and lunges. Patient does demonstrate hip ER when performing standing hip abduction due to weakness of gluteus medius. Ended visit with supine stretching and core stabilization training. Patient noted that his hips were sore but not more painful post treatment today. Patient is progressing program nicely at this time. GOALS:  Patient Goal: Get some mobility back and not hurt doing it. Short Term Goals:  Time Frame: 4 weeks    1. Patient will report reduction of pain in low back and B hips to less than or equal to 4/10 at worst, 1/10 average to improve sleep with 6 hours each night. 2.  Patient will demonstrate increased lumbar AROM by >10 degrees each motion pain free to improve bending and stooping tasks. 3.  Reduce palpable muscle spasms and trigger points in lumbosacral region by at least 50% to improve overall tissue extensibility. Long Term Goals:  Time Frame: 8 weeks    1. Patient will demonstrate improved core strength to good technique for ab brace and be able to maintain brace in standing position for safety with lifting.   2.  Increase B hip strength to greater than or equal to 4+/5 and be able to perform all functional standing

## 2021-04-08 ENCOUNTER — HOSPITAL ENCOUNTER (OUTPATIENT)
Dept: PHYSICAL THERAPY | Age: 61
Setting detail: THERAPIES SERIES
Discharge: HOME | End: 2021-04-08
Payer: OTHER GOVERNMENT

## 2021-04-08 NOTE — PROGRESS NOTES
[address] [address]  Therapy Contact Note      Date: 2021  Patient Name: Gabriella Garrison        MRN: [id number]    : 1960  (63 y.o.)  Gender: male   Goyo Yanez,   Low back pain [M54.5]     Patient arrived for scheduled visit but once in the clinic informed therapist that he was not feeling well, having elevated pain complaints as well as headache and upset stomach. Patient stated, \"I just don't feel well at all today. \" Patient indicated that he was going to cancel this morning but thought he would be penalized for missing the appointment. Therapist offered to cancel without any penalty and patient gladly cancelled the session. Rescheduled visit for patient and he ambulated out of the clinic.      Marcellus Morin PT, DPT

## 2021-04-12 ENCOUNTER — APPOINTMENT (OUTPATIENT)
Dept: PHYSICAL THERAPY | Age: 61
End: 2021-04-12
Payer: OTHER GOVERNMENT

## 2021-04-13 ENCOUNTER — HOSPITAL ENCOUNTER (OUTPATIENT)
Dept: PHYSICAL THERAPY | Age: 61
Setting detail: THERAPIES SERIES
Discharge: HOME | End: 2021-04-13
Payer: OTHER GOVERNMENT

## 2021-04-13 PROCEDURE — 97110 THERAPEUTIC EXERCISES: CPT

## 2021-04-13 NOTE — PROGRESS NOTES
[address] [facility]  PHYSICAL THERAPY  [] EVALUATION  [x] DAILY NOTE (LAND) [] DAILY NOTE (AQUATIC )  [] PROGRESS NOTE [] DISCHARGE NOTE    [x] OUTPATIENT REHABILITATION [facility]   [] [place] [address]    [] [facility]   [] Alexandro Santiago    Date: 2021  Patient Name:  Jayy Espinoza  : 1960  MRN: [id number]  CSN: [id number]    Referring Practitioner Overton Frankel, DO   Diagnosis Low back pain [M54.5]    Treatment Diagnosis Low back pain, B hip pain, stiffness in spine and hips, muscle weakness, difficulty walking, poor posture   Date of Evaluation 3/4/21    Additional Pertinent History Multiple MI (most recent 2019) with 3 stents, HTN, a-fib, emphysema with SOB, anxiety      Functional Outcome Measure Used Back Index   Functional Outcome Score 54% disability (3/4/21)       Insurance: Primary: Payor: Bigg Torres /  /  / ,   Secondary:    Authorization Information: No precert; aquatics, modalities are covered, no massage   Visit # 11, 11/15 for progress note   Visits Allowed: 15 visits   Recertification Date:    Physician Follow-Up: May 2021   Physician Orders:    History of Present Illness: Patient presents with chronic low back pain. Patient reports that 20-25 years ago he was moving a refrigerator and \"put my back out\" resulting in injury to L5. Patient went to chiropractor extensively for 2.5 years with symptoms eventually subsiding. Then 5-6 years ago, he began having pain in the low back and bilateral hips. He reports recent imaging revealed presence of arthritis in both hips. Patient takes hydrocodone daily to manage this but would like to reduce usage of pain medication. Patient reports use of heating pad seems to help as well. Patient reports that once in a great while he will experience shooting pain into LLE. Patient denies presence of numbness or tingling in BLE. Negative valsalva.       SUBJECTIVE:  Patient reports his back isn't feeling too bad this morning. States that he had his second Covid shot on Saturday and didn't feel well the past two days. Reports he hasn't done all of his home exercise but he did do some walking. TREATMENT   Precautions: Multiple MI with stents, HTN   Pain: 4/10 Bilteral hips, low back    X in shaded column indicates activity completed today   Modalities Parameters/  Location  Notes   Moist Heat to lower back   Concurrent with supine exercises               Manual Therapy Time/Technique  Notes                     Exercise/Intervention   Notes   NuStep Level 3  6 minutes  Seat 7, Arms 8   Piriformis & Hamstring Stretch 3x Bilateral each way 20 seconds X           Abdominal Brace 15x 5 seconds     Abdominal Brace March 15x bilateral      Abdominal Brace SAQ 15x bilateral 5 seconds     Abdominal Brace ball squeeze 15x bilateral 5 seconds     Abdominal Brace SLR 15x bilateral      Bridge 15x 3 seconds X    Clamshell with orange band, reverse clamshell 15x each Bilateral 3 seconds  X                  Seated on grey thera-disc:        Abdominal Brace 10x  5 seconds      March, LAQ 20x each Bilateral  X    Hip abduction/adduction with ball and band 20x each  3 seconds X Orange band with hip abduction. Performed bilaterally and unilaterally. Rows, shoulder extension,  with Theraband 20x Orange  X                  HydroChest press  2x15    Level 2   HydroStick forward/back, side to side 15x   Cues for bracing, standing on balance balls   HydroHip Bilateral and single x20 B, x10 U  X Level 4 B   Standing 3-way hip standing on blue foam 15 bilateral  X Abduction and flexion; BUE support   Step Ups onto BOSU 15x bilateral  Forward X BUE support   Lunge onto BOSU 10x bilateral  X    Sidestepping with orange band around knees 2 laps  X      Specific Interventions Next Treatment: Progress core stabilization program in upright positions as much as possible. Can revisit sidelying hip strengthening as needed to check technique. Activity/Treatment Tolerance:  [x]  Patient tolerated treatment well  []  Patient limited by fatigue  []  Patient limited by pain   []  Patient limited by medical complications  []  Other:     Assessment:  Added orange resistance band to sidelying clamshells and sidestepping today. Patient tolerated activities well without increase pain at end of session. Cues needed to maintain abdominal bracing with various seated stability disc exercises. Encouraged patient to focus on posture and muscle control with activities, especially engagement of abdominals and gluteals. GOALS:  Patient Goal: Get some mobility back and not hurt doing it. Short Term Goals:  Time Frame: 4 weeks    1. Patient will report reduction of pain in low back and B hips to less than or equal to 4/10 at worst, 1/10 average to improve sleep with 6 hours each night. 2.  Patient will demonstrate increased lumbar AROM by >10 degrees each motion pain free to improve bending and stooping tasks. 3.  Reduce palpable muscle spasms and trigger points in lumbosacral region by at least 50% to improve overall tissue extensibility. Long Term Goals:  Time Frame: 8 weeks    1. Patient will demonstrate improved core strength to good technique for ab brace and be able to maintain brace in standing position for safety with lifting. 2.  Increase B hip strength to greater than or equal to 4+/5 and be able to perform all functional standing tasks and stair climbing in the home and community. 3.  Patient will be indep with comprehensive HEP. 4.  Decreased disability on Back Index to less than 25% disability to improve spine mobility with bathing, dressing, and housework as well as appropriate work tasks. Patient Education:   [x]  HEP/Education Completed: Continue with HEP, added sidestepping and orange band with clamshells.     AIM Access Code: [email or web address]  []  No new Education completed  [x]  Reviewed Prior HEP      [x]  Patient verbalized and/or demonstrated understanding of education provided. []  Patient unable to verbalize and/or demonstrate understanding of education provided. Will continue education. []  Barriers to learning:     PLAN:  Treatment Recommendations: Strengthening, Range of Motion, Functional Mobility Training, Endurance Training, Neuromuscular Re-education, Manual Therapy - Soft Tissue Mobilization, Pain Management, Home Exercise Program, Patient Education, Positioning, Integrative Dry Needling, Aquatics and Modalities    []  Plan of care initiated. Plan to see patient 2 times per week for 8 weeks (15 visits approved) to address the treatment plan outlined above.   [x]  Continue with current plan of care  []  Modify plan of care as follows:    []  Hold pending physician visit  []  Discharge    Time In 0830   Time Out 0915   Timed Code Minutes: 45 min   Total Treatment Time:  45 min       Electronically Signed by: Angela Sevilla

## 2021-04-15 ENCOUNTER — HOSPITAL ENCOUNTER (OUTPATIENT)
Dept: PHYSICAL THERAPY | Age: 61
Setting detail: THERAPIES SERIES
Discharge: HOME | End: 2021-04-15
Payer: OTHER GOVERNMENT

## 2021-04-15 PROCEDURE — 97110 THERAPEUTIC EXERCISES: CPT

## 2021-04-15 NOTE — PROGRESS NOTES
Tolerance:  [x]  Patient tolerated treatment well  []  Patient limited by fatigue  []  Patient limited by pain   []  Patient limited by medical complications  []  Other:     Assessment:  Patient tolerated activities well today. Patient demonstrating improved control of core while sitting on grey disc. Patient's pain decreased to 2/10 by end of session. Increased resistance on hydrochest and added lateral lunges onto BOSU.     GOALS:  Patient Goal: Get some mobility back and not hurt doing it. Short Term Goals:  Time Frame: 4 weeks    1. Patient will report reduction of pain in low back and B hips to less than or equal to 4/10 at worst, 1/10 average to improve sleep with 6 hours each night. 2.  Patient will demonstrate increased lumbar AROM by >10 degrees each motion pain free to improve bending and stooping tasks. 3.  Reduce palpable muscle spasms and trigger points in lumbosacral region by at least 50% to improve overall tissue extensibility. Long Term Goals:  Time Frame: 8 weeks    1. Patient will demonstrate improved core strength to good technique for ab brace and be able to maintain brace in standing position for safety with lifting. 2.  Increase B hip strength to greater than or equal to 4+/5 and be able to perform all functional standing tasks and stair climbing in the home and community. 3.  Patient will be indep with comprehensive HEP. 4.  Decreased disability on Back Index to less than 25% disability to improve spine mobility with bathing, dressing, and housework as well as appropriate work tasks. Patient Education:   [x]  HEP/Education Completed: Continue with HEP, added lateral lunges    [email or web address] Access Code: [email or web address]  []  No new Education completed  [x]  Reviewed Prior HEP      [x]  Patient verbalized and/or demonstrated understanding of education provided. []  Patient unable to verbalize and/or demonstrate understanding of education provided. Will continue education.   []

## 2021-04-19 ENCOUNTER — HOSPITAL ENCOUNTER (OUTPATIENT)
Dept: PHYSICAL THERAPY | Age: 61
Setting detail: THERAPIES SERIES
Discharge: HOME | End: 2021-04-19
Payer: OTHER GOVERNMENT

## 2021-04-19 PROCEDURE — 97110 THERAPEUTIC EXERCISES: CPT

## 2021-04-19 NOTE — PROGRESS NOTES
[address] [facility]  PHYSICAL THERAPY  [] EVALUATION  [x] DAILY NOTE (LAND) [] DAILY NOTE (AQUATIC )  [] PROGRESS NOTE [] DISCHARGE NOTE    [x] OUTPATIENT REHABILITATION CENTER [facility]   [] [place] [address]    [] [facility]   [] Beckie Leon    Date: 2021  Patient Name:  Luigi Petit  : 1960  MRN: [id number]  CSN: [id number]    Referring Practitioner Garland Mondragon DO   Diagnosis Low back pain [M54.5]    Treatment Diagnosis Low back pain, B hip pain, stiffness in spine and hips, muscle weakness, difficulty walking, poor posture   Date of Evaluation 3/4/21    Additional Pertinent History Multiple MI (most recent 2019) with 3 stents, HTN, a-fib, emphysema with SOB, anxiety      Functional Outcome Measure Used Back Index   Functional Outcome Score 54% disability (3/4/21)       Insurance: Primary: Payor: Kyle Colindres /  /  / ,   Secondary:    Authorization Information: No precert; aquatics, modalities are covered, no massage   Visit # 13, 13/15 for progress note   Visits Allowed: 15 visits   Recertification Date: 0/88/15   Physician Follow-Up: May 2021   Physician Orders:    History of Present Illness: Patient presents with chronic low back pain. Patient reports that 20-25 years ago he was moving a refrigerator and \"put my back out\" resulting in injury to L5. Patient went to chiropractor extensively for 2.5 years with symptoms eventually subsiding. Then 5-6 years ago, he began having pain in the low back and bilateral hips. He reports recent imaging revealed presence of arthritis in both hips. Patient takes hydrocodone daily to manage this but would like to reduce usage of pain medication. Patient reports use of heating pad seems to help as well. Patient reports that once in a great while he will experience shooting pain into LLE. Patient denies presence of numbness or tingling in BLE. Negative valsalva.       SUBJECTIVE:  Patient reports he woke up feeling good today and rates pain 3/10 in B hips. Patient did take hydrocodone before visit today. Patient reports that he will occasionally awake with soreness the day after PT but it is tolerable and the current challenge level of PT is good. Started visit early as patient had to get across town for a cardiology appointment. TREATMENT   Precautions: Multiple MI with stents, HTN   Pain: 3/10 Bilteral hips, low back    X in shaded column indicates activity completed today   Modalities Parameters/  Location  Notes   Moist Heat to lower back   Concurrent with supine exercises               Manual Therapy Time/Technique  Notes                     Exercise/Intervention   Notes   NuStep Level 4  6 minutes X Seat 7, Arms 8   Piriformis & Hamstring Stretch 3x Bilateral each way 20 seconds            Abdominal Brace 15x 5 seconds     Abdominal Brace March 15x bilateral      Abdominal Brace SAQ 15x bilateral 5 seconds     Abdominal Brace ball squeeze 15x bilateral 5 seconds     Abdominal Brace SLR 15x bilateral      Bridge 15x 3 seconds     Clamshell with orange band, reverse clamshell 15x each Bilateral 3 seconds             Seated on grey thera-disc:        Abdominal Brace 10x  5 seconds  X    March, LAQ 20x each Bilateral  X    Hip abduction/adduction with ball and band 20x each  3 seconds X Orange band with hip abduction. Performed bilaterally and unilaterally.     Rows, shoulder extension 2x15 25lbs X Cable column- standing   Shoulder Horizontal Abduction with Tband 15x  X Orange          HydroChest press  2x15  Level 5 X    HydroStick forward/back, side to side, circles clockwise/counter-clockwise 15x  X Cues for bracing, standing on balance balls   HydroHip Bilateral and single  x20 Bilateral  x15 Unilateral Level 5  Level 4 X    Standing 3-way hip standing on blue foam 15 bilateral  X All motions; 1 UE support   Step Ups onto BOSU 15x bilateral  Forward X Bilateral UE support   Lunge onto BOSU forward and lateral 15x bilateral  X    Sidestepping with orange band around knees 3 laps  X    Standing march 10x bilateral  X                    Specific Interventions Next Treatment: Progress core stabilization program in upright positions as much as possible. Activity/Treatment Tolerance:  [x]  Patient tolerated treatment well  []  Patient limited by fatigue  []  Patient limited by pain   []  Patient limited by medical complications  []  Other:     Assessment: Progressed program today with addition of standing march and increased repetitions for lunge onto BOSU and resistance level on hydrohip and hydrochest. Cued patient throughout visit to maintain abdominal brace throughout standing program and to focus on upright posture. Progressions were well tolerated this date, no increase in hip pain but patient was generally fatigued. Patient is making steady progress at this time, nearing readiness for discharge once approved visits are completed. GOALS:  Patient Goal: Get some mobility back and not hurt doing it. Short Term Goals:  Time Frame: 4 weeks    1. Patient will report reduction of pain in low back and B hips to less than or equal to 4/10 at worst, 1/10 average to improve sleep with 6 hours each night. 2.  Patient will demonstrate increased lumbar AROM by >10 degrees each motion pain free to improve bending and stooping tasks. 3.  Reduce palpable muscle spasms and trigger points in lumbosacral region by at least 50% to improve overall tissue extensibility. Long Term Goals:  Time Frame: 8 weeks    1. Patient will demonstrate improved core strength to good technique for ab brace and be able to maintain brace in standing position for safety with lifting. 2.  Increase B hip strength to greater than or equal to 4+/5 and be able to perform all functional standing tasks and stair climbing in the home and community. 3.  Patient will be indep with comprehensive HEP.    4.  Decreased disability on Back Index to less than 25% disability to improve spine mobility with bathing, dressing, and housework as well as appropriate work tasks. Patient Education:   []  HEP/Education Completed:    [address] Access Code: [email or web address]  []  No new Education completed  [x]  Reviewed Prior HEP      [x]  Patient verbalized and/or demonstrated understanding of education provided. []  Patient unable to verbalize and/or demonstrate understanding of education provided. Will continue education. []  Barriers to learning:     PLAN:  Treatment Recommendations: Strengthening, Range of Motion, Functional Mobility Training, Endurance Training, Neuromuscular Re-education, Manual Therapy - Soft Tissue Mobilization, Pain Management, Home Exercise Program, Patient Education, Positioning, Integrative Dry Needling, Aquatics and Modalities    []  Plan of care initiated. Plan to see patient 2 times per week for 8 weeks (15 visits approved) to address the treatment plan outlined above.   [x]  Continue with current plan of care  []  Modify plan of care as follows:    []  Hold pending physician visit  []  Discharge    Time In 0750   Time Out 0835   Timed Code Minutes: 45 min   Total Treatment Time:  45 min       Electronically Signed by: Gela Chen

## 2021-04-22 ENCOUNTER — HOSPITAL ENCOUNTER (OUTPATIENT)
Dept: PHYSICAL THERAPY | Age: 61
Setting detail: THERAPIES SERIES
Discharge: HOME | End: 2021-04-22
Payer: OTHER GOVERNMENT

## 2021-04-22 PROCEDURE — 97110 THERAPEUTIC EXERCISES: CPT

## 2021-04-22 NOTE — PROGRESS NOTES
[address] [facility]  PHYSICAL THERAPY  [] EVALUATION  [x] DAILY NOTE (LAND) [] DAILY NOTE (AQUATIC )  [] PROGRESS NOTE [] DISCHARGE NOTE    [x] OUTPATIENT REHABILITATION [facility]   [] [place] [address]    [] [facility]   [] Mahogany Kaplan    Date: 2021  Patient Name:  Marcellus Chang  : 1960  MRN: [id number]  CSN: [id number]    Referring Practitioner Milagros Kang DO   Diagnosis Low back pain [M54.5]    Treatment Diagnosis Low back pain, B hip pain, stiffness in spine and hips, muscle weakness, difficulty walking, poor posture   Date of Evaluation 3/4/21    Additional Pertinent History Multiple MI (most recent 2019) with 3 stents, HTN, a-fib, emphysema with SOB, anxiety      Functional Outcome Measure Used Back Index   Functional Outcome Score 54% disability (3/4/21)       Insurance: Primary: Payor: Evelio Hassan /  /  / ,   Secondary:    Authorization Information: No precert; aquatics, modalities are covered, no massage   Visit # 14, 14/15 for progress note   Visits Allowed: 15 visits   Recertification Date:    Physician Follow-Up: May 2021   Physician Orders:    History of Present Illness: Patient presents with chronic low back pain. Patient reports that 20-25 years ago he was moving a refrigerator and \"put my back out\" resulting in injury to L5. Patient went to chiropractor extensively for 2.5 years with symptoms eventually subsiding. Then 5-6 years ago, he began having pain in the low back and bilateral hips. He reports recent imaging revealed presence of arthritis in both hips. Patient takes hydrocodone daily to manage this but would like to reduce usage of pain medication. Patient reports use of heating pad seems to help as well. Patient reports that once in a great while he will experience shooting pain into LLE. Patient denies presence of numbness or tingling in BLE. Negative valsalva.       SUBJECTIVE:  Patient reports his cardiology appointment went well the other day and his BP medication was adjusted and he was prescribed a cholesterol medication. Patient notes that he experienced mild soreness following last visit but it was tolerable. This morning, pain is in B hips and SI region, rated 3-4/10. Patient reports he is looking into getting a gym membership to continue with strengthening when PT is completed next visit. Patient voiced that symptoms are much better than at start of care. TREATMENT   Precautions: Multiple MI with stents, HTN   Pain: 3-4/10 Bilteral hips, low back    X in shaded column indicates activity completed today   Modalities Parameters/  Location  Notes   Moist Heat to lower back   Concurrent with supine exercises               Manual Therapy Time/Technique  Notes                     Exercise/Intervention   Notes   NuStep Level 4  7 minutes X Seat 7, Arms 8   Piriformis & Hamstring Stretch 3x Bilateral each way 20 seconds            Abdominal Brace 15x 5 seconds     Abdominal Brace March 15x bilateral      Abdominal Brace SAQ 15x bilateral 5 seconds     Abdominal Brace ball squeeze 15x bilateral 5 seconds     Abdominal Brace SLR 15x bilateral      Bridge 15x 3 seconds     Clamshell with orange band, reverse clamshell 15x each Bilateral 3 seconds             Seated on grey thera-disc:        Abdominal Brace 10x  5 seconds  X    March, LAQ 20x each Bilateral  X    Hip abduction/adduction with ball and band 20x each  3 seconds X Orange band with hip abduction. Performed bilaterally and unilaterally.     Rows, shoulder extension 2x15 30lbs X Cable column- standing   Shoulder Horizontal Abduction with Tband 15x  X Orange   Reactive Rows 10x 30lbs X Cable Column   Latt Pulldown 15x 50lbs X    HydroChest press  2x20  Level 5 X    HydroStick forward/back, side to side, circles clockwise/counter-clockwise 15x  X Cues for bracing, standing on balance balls   HydroHip Bilateral and single  x20 Bilateral  x15 Unilateral Level 5  Level 5 X  X    Standing 3-way hip standing on blue foam 15 bilateral  X All motions; 1 UE support   Step Ups onto BOSU 15x bilateral  Fwd/lat X Bilateral UE support   Lunge onto BOSU forward and lateral 15x bilateral  X    Sidestepping with orange band around knees 2 laps  X Back Hallway; 1 lap is down and back   Standing march 15x bilateral  X FOAM          Box Lift Training- squat lifts and lift and carry 10x each 15lbs X Box + 5lbs     Specific Interventions Next Treatment: Progress core stabilization program in upright positions as much as possible. Activity/Treatment Tolerance:  [x]  Patient tolerated treatment well  []  Patient limited by fatigue  []  Patient limited by pain   []  Patient limited by medical complications  []  Other:     Assessment: Progressed program today with lateral step ups and latt pulldown machine to enhance frontal plane hip control as well as posture and trunk extensor strength. Also added foam to standing marching today and increased repetitions for this exercise. Reactive rows also completed this date at the cable column with emphasis on maintaining abdominal brace. Also provided instruction in proper lifting technique with patient noting greatest challenge with squatting being knee mobility to avoid bending at the back. GOALS:  Patient Goal: Get some mobility back and not hurt doing it. Short Term Goals:  Time Frame: 4 weeks    1. Patient will report reduction of pain in low back and B hips to less than or equal to 4/10 at worst, 1/10 average to improve sleep with 6 hours each night. 2.  Patient will demonstrate increased lumbar AROM by >10 degrees each motion pain free to improve bending and stooping tasks. 3.  Reduce palpable muscle spasms and trigger points in lumbosacral region by at least 50% to improve overall tissue extensibility. Long Term Goals:  Time Frame: 8 weeks    1.  Patient will demonstrate improved core strength to good technique for ab brace and be able to maintain brace in standing position for safety with lifting. 2.  Increase B hip strength to greater than or equal to 4+/5 and be able to perform all functional standing tasks and stair climbing in the home and community. 3.  Patient will be indep with comprehensive HEP. 4.  Decreased disability on Back Index to less than 25% disability to improve spine mobility with bathing, dressing, and housework as well as appropriate work tasks. Patient Education:   []  HEP/Education Completed:    Yair Hahn Access Code: [email or web address]  []  No new Education completed  [x]  Reviewed Prior HEP      [x]  Patient verbalized and/or demonstrated understanding of education provided. []  Patient unable to verbalize and/or demonstrate understanding of education provided. Will continue education. []  Barriers to learning:     PLAN:  Treatment Recommendations: Strengthening, Range of Motion, Functional Mobility Training, Endurance Training, Neuromuscular Re-education, Manual Therapy - Soft Tissue Mobilization, Pain Management, Home Exercise Program, Patient Education, Positioning, Integrative Dry Needling, Aquatics and Modalities    []  Plan of care initiated. Plan to see patient 2 times per week for 8 weeks (15 visits approved) to address the treatment plan outlined above.   [x]  Continue with current plan of care with discharge anticipated next visit  []  Modify plan of care as follows:    []  Hold pending physician visit  []  Discharge    Time In 0750   Time Out 0845   Timed Code Minutes: 55 min   Total Treatment Time:  55 min       Electronically Signed by: Eugenia Israel

## 2021-04-26 ENCOUNTER — HOSPITAL ENCOUNTER (OUTPATIENT)
Dept: PHYSICAL THERAPY | Age: 61
Setting detail: THERAPIES SERIES
Discharge: HOME | End: 2021-04-26
Payer: OTHER GOVERNMENT

## 2021-04-26 PROCEDURE — 97110 THERAPEUTIC EXERCISES: CPT

## 2021-04-26 NOTE — DISCHARGE SUMMARY
[address] [facility]  PHYSICAL THERAPY  [] EVALUATION  [] DAILY NOTE (LAND) [] DAILY NOTE (AQUATIC )  [] PROGRESS NOTE [x] DISCHARGE NOTE    [x] OUTPATIENT REHABILITATION [facility]   [] [place] [address]    [] [facility]   [] Geri Domínguez    Date: 2021  Patient Name:  Winnie Damon  : 1960  MRN: [id number]  CSN: [id number]    Referring Practitioner Marely Ziegler DO   Diagnosis Low back pain [M54.5]    Treatment Diagnosis Low back pain, B hip pain, stiffness in spine and hips, muscle weakness, difficulty walking, poor posture   Date of Evaluation 3/4/21    Additional Pertinent History Multiple MI (most recent 2019) with 3 stents, HTN, a-fib, emphysema with SOB, anxiety      Functional Outcome Measure Used Back Index   Functional Outcome Score 50% disability (21); 54% disability (3/4/21)       Insurance: Primary: Payor: Maritza Boland /  /  / ,   Secondary:    Authorization Information: No precert; aquatics, modalities are covered, no massage   Visit # 15,15/15 for progress note   Visits Allowed: 15 visits   Recertification Date:    Physician Follow-Up: May 2021   Physician Orders:    History of Present Illness: Patient presents with chronic low back pain. Patient reports that 20-25 years ago he was moving a refrigerator and \"put my back out\" resulting in injury to L5. Patient went to chiropractor extensively for 2.5 years with symptoms eventually subsiding. Then 5-6 years ago, he began having pain in the low back and bilateral hips. He reports recent imaging revealed presence of arthritis in both hips. Patient takes hydrocodone daily to manage this but would like to reduce usage of pain medication. Patient reports use of heating pad seems to help as well. Patient reports that once in a great while he will experience shooting pain into LLE. Patient denies presence of numbness or tingling in BLE. Negative valsalva.       SUBJECTIVE:  Patient reports minimal soreness following last visit and rates overall improvement with PT is 30%. Patient feels stronger and standing is somewhat improved. Pain today is in hips and lower back. Patient reports he and his wife are going to go to the local gyms this weekend to obtain a visitor pass and try them out to see if it is something they want to do. He continues to do HEP daily. Sleep is overall better but he still has nights with less than 5 hours due to hip pain and difficulty laying on either side. TREATMENT   Precautions: Multiple MI with stents, HTN   Pain: 5/10 Bilteral hips, low back    X in shaded column indicates activity completed today   Modalities Parameters/  Location  Notes   Moist Heat to lower back   Concurrent with supine exercises               Manual Therapy Time/Technique  Notes                     Exercise/Intervention   Notes   NuStep Level 4  6 minutes X Seat 7, Arms 8   Piriformis & Hamstring Stretch 3x Bilateral each way 20 seconds            Abdominal Brace 15x 5 seconds     Abdominal Brace March 15x bilateral      Abdominal Brace SAQ 15x bilateral 5 seconds     Abdominal Brace ball squeeze 15x bilateral 5 seconds     Abdominal Brace SLR 15x bilateral      Bridge 15x 3 seconds     Clamshell with orange band, reverse clamshell 15x each Bilateral 3 seconds             Seated on grey thera-disc:        Abdominal Brace 10x  5 seconds  X    March, LAQ 20x each Bilateral  X    Hip abduction/adduction with ball and band 20x each  3 seconds X Orange band with hip abduction. Performed bilaterally and unilaterally.     Rows, shoulder extension 2x15 30lbs X Cable column- standing   Shoulder Horizontal Abduction with Tband 15x  X Orange   Reactive Rows 10x 30lbs X Cable Column   Latt Pulldown 2x10 50lbs X    HydroChest press  2x20  Level 5 X    HydroStick forward/back, side to side, circles clockwise/counter-clockwise 15x  X Cues for bracing, standing on balance balls   HydroHip Bilateral and single x20 Bilateral  x15 Unilateral Level 5  Level 5 X  X    Standing 3-way hip standing on blue foam 15 bilateral  X All motions; 1 UE support   Step Ups onto BOSU 15x bilateral  Fwd/lat X Bilateral UE support   Lunge onto BOSU forward and lateral 15x bilateral  X    Sidestepping with orange band around knees 2 laps  X Back Hallway; 1 lap is down and back   Standing march 15x bilateral  X FOAM          Box Lift Training- squat lifts and lift and carry 10x each 15lbs X Box + 5lbs     Specific Interventions Next Treatment: Progress core stabilization program in upright positions as much as possible. Activity/Treatment Tolerance:  [x]  Patient tolerated treatment well  []  Patient limited by fatigue  []  Patient limited by pain   []  Patient limited by medical complications  []  Other:     Assessment: Patient subjectively reports 30% improvement since starting PT. Although he feels stronger and this is confirmed with MMT, difficulty with standing, walking, and sleeping persists. Patient demonstrates excellent exercise recall and ability to continue with HEP at this time and plans to seek gym membership to continue with strengthening. Patient also has improved trunk mobility since starting therapy, exhibiting more than 10 deg improvement in all motions except left sidebening and ROM testing is no longer painful. Patient demonstrates improved abdominal brace technique and can maintain the brace in standing functional positions. In recent weeks, have ramped up strengthening program to include upper and lower body strengthening with good tolerance. Patient has met several goals and made good progress toward remaining ones. GOALS:  Patient Goal: Get some mobility back and not hurt doing it. Short Term Goals:  Time Frame: 4 weeks    1. Patient will report reduction of pain in low back and B hips to less than or equal to 4/10 at worst, 1/10 average to improve sleep with 6 hours each night.  GOAL PARTIALLY MET- \"For the most part, my pain has been in the 4-5 range but I am still having rough nights of sleep. \"    2. Patient will demonstrate increased lumbar AROM by >10 degrees each motion pain free to improve bending and stooping tasks. GOAL MET  BACK RANGE OF MOTION   Flexion 82 deg   Extension 19 deg   Sidebending Right 26 deg   Sidebending Left 22 deg   Back Range of Motion is [facility]  []     3. Reduce palpable muscle spasms and trigger points in lumbosacral region by at least 50% to improve overall tissue extensibility. GOAL MET      Long Term Goals:  Time Frame: 8 weeks    1. Patient will demonstrate improved core strength to good technique for ab brace and be able to maintain brace in standing position for safety with lifting. GOAL MET- Patient is now able to perform and maintain an abdominal brace in standing position. Box lifting performed with 15lbs with proper technique demonstrated. 2.  Increase B hip strength to greater than or equal to 4+/5 and be able to perform all functional standing tasks and stair climbing in the home and community. GOAL MET  LE STRENGTH R L   Hip Flexion 4+/5 4+/5   Hip Abduction 4+/5 4/5   Hip Adduction 5/5 5/5   Knee Flexion 5/5 4+/5   Knee Extension 5/5 5/5   Ankle DF 5/5 5/5   Ankle PF 5/5 5/5       3. Patient will be indep with comprehensive HEP. GOAL MET- Patient completes HEP daily and has been considering obtaining a gym membership. 4.  Decreased disability on Back Index to less than 25% disability to improve spine mobility with bathing, dressing, and housework as well as appropriate work tasks. GOAL NOT MET- Score today was 50% disability, only slight reduction from 54% at evaluation. Patient Education:   []  HEP/Education Completed:    [address] Access Code: [email or web address]  []  No new Education completed  [x]  Reviewed Prior HEP      [x]  Patient verbalized and/or demonstrated understanding of education provided.   []  Patient unable to verbalize and/or demonstrate understanding of education provided. Will continue education. []  Barriers to learning:     PLAN:  Treatment Recommendations: Strengthening, Range of Motion, Functional Mobility Training, Endurance Training, Neuromuscular Re-education, Manual Therapy - Soft Tissue Mobilization, Pain Management, Home Exercise Program, Patient Education, Positioning, Integrative Dry Needling, Aquatics and Modalities    []  Plan of care initiated. Plan to see patient 2 times per week for 8 weeks (15 visits approved) to address the treatment plan outlined above.   []  Continue with current plan of care with discharge anticipated next visit  []  Modify plan of care as follows:    []  Hold pending physician visit  [x]  Discharge    Time In 0922   Time Out 1017   Timed Code Minutes: 55 min   Total Treatment Time:  55 min       Electronically Signed by: Rosalba Menendez

## 2021-12-10 ENCOUNTER — HOSPITAL ENCOUNTER (OUTPATIENT)
Dept: PHYSICAL THERAPY | Age: 61
Setting detail: THERAPIES SERIES
Discharge: HOME | End: 2021-12-10
Payer: OTHER GOVERNMENT

## 2021-12-10 PROCEDURE — 97162 PT EVAL MOD COMPLEX 30 MIN: CPT

## 2021-12-10 NOTE — PROGRESS NOTES
** PLEASE SIGN, DATE AND TIME CERTIFICATION BELOW AND RETURN TO [facility] OUTPATIENT REHABILITATION (FAX #: [phone]). ATTEST/CO-SIGN IF ACCESSING VIA INFreebeepay. THANK YOU.**    I certify that I have examined the patient below and determined that Physical Medicine and Rehabilitation service is necessary and that I approve the established plan of care for up to 90 days or as specifically noted. Attestation, signature or co-signature of physician indicates approval of certification requirements.    ________________________ ____________ __________  Physician Signature   Date   Time  [address] [facility]  PHYSICAL THERAPY  [x] EVALUATION  [] DAILY NOTE (LAND) [] DAILY NOTE (AQUATIC ) [] PROGRESS NOTE [] DISCHARGE NOTE    [] [facility]   [] [place] [address]    [] [address] [facility]   [] Mirela Frances    Date: 12/10/2021  Patient Name:  Maria A Howard  : 1960  MRN: [id number]  CSN: [id number]    Referring Practitioner Henri Gaffney DO   Diagnosis Low back pain, unspecified [M54.50]    Treatment Diagnosis Lumbosacral pain increased with sitting, standing and walking. Impaired functional mobility. Date of Evaluation 12/10/21    Additional Pertinent History Obesity, Afib history, CAD, DM, HTN, chronic low back pain, 3 stents with multiple MIs, emphysema with shortness of breath.        Functional Outcome Measure Used Modified Oswestry   Functional Outcome Score 25/50 @ 50%  (12/10/21) evaluation      Insurance: Primary: Payor: Ana Luisa Silva /  /  / ,   Secondary:    Authorization Information: PRE CERTIFICATION REQUIRED: NO  INSURANCE THERAPY BENEFIT:  15 VISITS FROM 12/10/21 THRU 22 UNDER AUTH# [id number], ANYTHING BEYOND 15 VISITS WOULD [facility]  AQUATIC THERAPY COVERED: YES  MODALITIES COVERED:  YES  TELEHEALTH COVERED:   REFERENCE NUMBER: [id number]   Visit # 1, 1/10 for progress note   Visits Allowed: 15 through 2/1/5012   Recertification Date: 0/5/2460   Physician Follow-Up: March 2022. Physician Orders: PT evaluation and treat approved 15 visits   History of Present Illness: Patient presents with chronic back pain. Notes he moved a refrigerator 20-25 years ago that 'put his back out'. Since then has had history chiropractic adjustments, series of 3x of PT visits. Patient points to bilateral lumbosacral region for area of pain that radiates laterally from spine. Also notes shooting pain down left leg from hip to his knee that is sharp. This shooting pain comes and goes. Notes sitting 30 minutes to 1 hour, standing initially difficult for him, stat. Standing within 10 minutes can bother his back, Difficulty sleeping due to difficulty finding a sleeping position to ease pain, also decreased activity levels with limited lifting, home activities and going out of home less frequently. He manages pain with Flexeril and Hydrocodone. Trazadone. Uses moist heating pad. Also finds that bending knees up with pillows under knees may rest a little bit easier. PT HEP has not been completed for awhile. Did complete them following previous session. Does try and walk for 30 minutes to 1 hour. Notes he had MRI in September or October of spine. SUBJECTIVE: Patient presents with chronic back pain. Notes he moved a refrigerator 20-25 years ago that 'put his back out'. Since then has had history chiropractic adjustments, series of 3x of PT visits. Patient points to bilateral lumbosacral region for area of pain that radiates laterally from spine. Also notes shooting pain down left leg from hip to his knee that is sharp. This shooting pain comes and goes. Notes sitting 30 minutes to 1 hour, standing initially difficult for him, stat.  Standing within 10 minutes can bother his back, Difficulty sleeping due to difficulty finding a sleeping position to ease pain, also decreased activity levels with limited lifting, home activities and going out of home less frequently. He manages pain with Flexeril and Hydrocodone. Trazadone. Uses moist heating pad. Also finds that bending knees up with pillows under knees may rest a little bit easier. PT HEP has not been completed for awhile. Did complete them following previous session. Does try and walk for 30 minutes to 1 hour. Notes he had MRI in September or October of spine. Recent CT scan of lungs 1 week ago with diagnosis of pneumonia. Social/Functional History and Current Status:  Medications and Allergies have been reviewed and are listed on Medical History Questionnaire. Padmini Gupta lives with spouse in a single story home with stairs and no handrail to enter. 3 steps    Task Previous Current   ADLs  Independent Modified Independent difficulty donning socks, crossing legs and limited activity at home ie lifting. IADL's Independent Modified Independent   Ambulation Independent Modified Independent decreased wt shift lle and pain with sit to stand slow and careful   Transfers Independent Modified Independent see above   Recreation Independent golf football, hunting, fishing not for 15-20 years with activity,  Modified Independent to unable, occasional fishing   Community Integration Independent Modified Independent   Driving Active  Active    Work On Disability  On Disability     Objective:    OBSERVATION   Pain 3/10 bilateral lumbosacral. Can increase to 8-9/10 and shooting pain LLE. Bilateral hip pain left >right   Palpation Noted muscle tension bilateral mid to lower lumbar paraspinals. Noted lumbosacral discomfort along spine. Noted tenderness posterior lateral greater trochanter region bilaterally right>left. Sensation Intact  Occasional radiating sharp pain lle from hip to knee. Edema nil   Bed Mobility    Transfers Moderate gaurding with decreased wt shift through LLE and slow and careful due to pain.     Ambulation Ambulates with slight forward flexed position of comfort with pain 3/10. Does not tolerate pronelying or extension into spine. Left hip ROM limited with flexion due to hip joint/inguinal pain. TREATMENT   Precautions: Flexion responder, extension increases pain. 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 Pain:     X in shaded column indicates activity completed today   Modalities Parameters/  Location  Notes                     Manual Therapy Time/Technique  Notes                     Exercise/Intervention   Notes   Posterior pelvic tilt  10x   x    Core control with abdominal engagement  With pelvic tilt and neutral position  x                                                                     Specific Interventions Next Treatment: US, soft tissue mobilization, therap ex for core control, flexibility in flexion program, keep in mind hip OA as well left hip>right hip. Position of comfort in supine hooklying. Does not tolerate 90/90 position or pronelying. Activity/Treatment Tolerance:  [x]  Patient tolerated treatment well  []  Patient limited by fatigue  [x]  Patient limited by pain   []  Patient limited by medical complications  []  Other:     Assessment: Patient referred to PT with chronic lower back pain. Note pain distribution bilateral lumbosacral and laterally with reports of occasional shooting pain LLE from hip to knee region. Also noting moderate restriction lumbar flexion but no curve reversal noted lumbar spine and major restriction extension. Noted decreased core control and awareness of lumbar neutral with testing. Noted decreased knowledge of body mechanics. Noted moderate muscle gaurding bilateral thoracic and lumbar paraspinals. Will follow 2x per week to address deficits as noted.      Body Structures/Functions/Activity Limitations: impaired activity tolerance, impaired muscle tone, impaired ROM, impaired strength, pain, abnormal gait and abnormal posture  Prognosis: good      Goals    Patient Goal: To have less back pain with improved tolerance of sitting, standing and walking, Improved activity level. Short Term Goals: 4 weeks  1. Patient to report of decreased pain levels with standing and walking from 3 up to 8-9 with standing and walking to 2-5/10.  2. Patient to demonstrate improvement with lumbar curve reversal with flexion with ease of low back pain and tightness with fingertip from floor to ankle or 2-4 inches fingertips from floor. 3. Patient to demonstrate increased core control with improve lumbar neutral awareness and increased core strength with improved stabilization with therap ex and progressions noted. 4. Patient to demonstrate increased hip ROM with SLR from 60-70 degrees to 80 degrees, Hip flexion left from 95 degrees to 110 degrees, right hip from 110 degrees to 115 degrees. 5. DEmonstrate knowledge of proper body mechanics to decrease stress and strain on his back. Long Term Goals: 8 weeks  1. MOdified Oswestry from 25/50 @ 50% to 25%  2. Patient independent in HEP with follow through with exercises for pain management. ROM, core strength, lumbar awareness, and progression in exercises. Patient Education:   [x]  HEP/Education Completed: Plan of Care, Goals,    Medbridge Access Code:  []  No new Education completed  []  Reviewed Prior HEP      [x]  Patient verbalized and/or demonstrated understanding of education provided. []  Patient unable to verbalize and/or demonstrate understanding of education provided. Will continue education. []  Barriers to learning:     PLAN:  Treatment Recommendations: Strengthening, Range of Motion, Balance Training, Functional Mobility Training, Transfer Training, Neuromuscular Re-education, Manual Therapy - Soft Tissue Mobilization, Pain Management, Home Exercise Program, Patient Education, Aquatics and Modalities    [x]  Plan of care initiated. Plan to see patient 2 times per week for 8 weeks to address the treatment planned outlined above.   []  Continue with current plan of care  []  Modify plan of care as follows:    []  Hold pending physician visit  []  Discharge    Time In 0735   Time Out 0823   Timed Code Minutes: 15 min   Total Treatment Time: 48 min     Electronically Signed by: Rupinder Sinclair PT

## 2021-12-22 ENCOUNTER — HOSPITAL ENCOUNTER (OUTPATIENT)
Dept: PHYSICAL THERAPY | Age: 61
Setting detail: THERAPIES SERIES
Discharge: HOME | End: 2021-12-22
Payer: OTHER GOVERNMENT

## 2021-12-22 PROCEDURE — 97110 THERAPEUTIC EXERCISES: CPT

## 2021-12-22 NOTE — PROGRESS NOTES
[address] [facility]  PHYSICAL THERAPY  [] EVALUATION  [x] DAILY NOTE (LAND) [] DAILY NOTE (AQUATIC ) [] PROGRESS NOTE [] DISCHARGE NOTE    [] [address] [place] [address]   [] [place] [address]    [] [facility]   [] Severiano Stapler    Date: 2021  Patient Name:  Gerry Mercedes  : 1960  MRN: [id number]  CSN: [id number]    Referring Practitioner Paul Mcfarland, DO   Diagnosis Low back pain, unspecified [M54.50]    Treatment Diagnosis Lumbosacral pain increased with sitting, standing and walking. Impaired functional mobility. Date of Evaluation 12/10/21    Additional Pertinent History Obesity, Afib history, CAD, DM, HTN, chronic low back pain, 3 stents with multiple MIs, emphysema with shortness of breath. Functional Outcome Measure Used Modified Oswestry   Functional Outcome Score 25/50 @ 50%  (12/10/21) evaluation      Insurance: Primary: Payor: Juliane Pierre /  /  / ,   Secondary:    Authorization Information: PRE CERTIFICATION REQUIRED: NO  INSURANCE THERAPY BENEFIT:  15 VISITS FROM 12/10/21 THRU 22 UNDER AUTH# [id number], ANYTHING BEYOND 15 VISITS WOULD NEED [facility]  AQUATIC THERAPY COVERED: YES  MODALITIES COVERED:  YES  TELEHEALTH COVERED:   REFERENCE NUMBER: [id number]   Visit # 2, 2/10 for progress note   Visits Allowed: 15 through 6/3/0304   Recertification Date:    Physician Follow-Up: 2022. Physician Orders: PT evaluation and treat approved 15 visits   History of Present Illness: Patient presents with chronic back pain. Notes he moved a refrigerator 20-25 years ago that 'put his back out'. Since then has had history chiropractic adjustments, series of 3x of PT visits. Patient points to bilateral lumbosacral region for area of pain that radiates laterally from spine. Also notes shooting pain down left leg from hip to his knee that is sharp. This shooting pain comes and goes.  Notes Patient limited by pain   []  Patient limited by medical complications  []  Other:     Assessment:  Patient requires moderate cuing for correct PPT technique, engaging abdominals, and minimizing excessive gluteal activation. Patient has 2 episodes of cramping in hamstring and left low back. Patient tolerates exercise and progressions well but is significantly challenged from a strengthening standpoint and demonstrates limited mobility with flexibility exercises today. Patient was instructed on manual therapy including soft tissue mobilization that may be beneficial and is an optional treatment that can be included in the future if patient desires. Body Structures/Functions/Activity Limitations: impaired activity tolerance, impaired muscle tone, impaired ROM, impaired strength, pain, abnormal gait and abnormal posture  Prognosis: good      Goals    Patient Goal: To have less back pain with improved tolerance of sitting, standing and walking, Improved activity level. Short Term Goals: 4 weeks  1. Patient to report of decreased pain levels with standing and walking from 3 up to 8-9 with standing and walking to 2-5/10.  2. Patient to demonstrate improvement with lumbar curve reversal with flexion with ease of low back pain and tightness with fingertip from floor to ankle or 2-4 inches fingertips from floor. 3. Patient to demonstrate increased core control with improve lumbar neutral awareness and increased core strength with improved stabilization with therap ex and progressions noted. 4. Patient to demonstrate increased hip ROM with SLR from 60-70 degrees to 80 degrees, Hip flexion left from 95 degrees to 110 degrees, right hip from 110 degrees to 115 degrees. 5. DEmonstrate knowledge of proper body mechanics to decrease stress and strain on his back. Long Term Goals: 8 weeks  1. MOdified Oswestry from 25/50 @ 50% to 25%  2.  Patient independent in [facility] with follow through with exercises for pain management. ROM, core strength, lumbar awareness, and progression in exercises. Patient Education:   [x]  HEP/Education Completed: handouts of added exercises today    [employer] Ware Access Code:  []  No new Education completed  []  Reviewed Prior HEP      [x]  Patient verbalized and/or demonstrated understanding of education provided. []  Patient unable to verbalize and/or demonstrate understanding of education provided. Will continue education. []  Barriers to learning:     PLAN:  Treatment Recommendations: Strengthening, Range of Motion, Balance Training, Functional Mobility Training, Transfer Training, Neuromuscular Re-education, Manual Therapy - Soft Tissue Mobilization, Pain Management, Home Exercise Program, Patient Education, Aquatics and Modalities    []  Plan of care initiated. Plan to see patient 2 times per week for 8 weeks to address the treatment planned outlined above.   [x]  Continue with current plan of care  []  Modify plan of care as follows:    []  Hold pending physician visit  []  Discharge    Time In 1038   Time Out 1122   Timed Code Minutes: 44 min   Total Treatment Time: 44 min     Electronically Signed by: Eli Johnson PTA

## 2021-12-27 ENCOUNTER — APPOINTMENT (OUTPATIENT)
Dept: PHYSICAL THERAPY | Age: 61
End: 2021-12-27
Payer: OTHER GOVERNMENT

## 2021-12-29 ENCOUNTER — HOSPITAL ENCOUNTER (OUTPATIENT)
Dept: PHYSICAL THERAPY | Age: 61
Setting detail: THERAPIES SERIES
End: 2021-12-29
Payer: OTHER GOVERNMENT

## 2022-01-03 ENCOUNTER — HOSPITAL ENCOUNTER (OUTPATIENT)
Dept: PHYSICAL THERAPY | Age: 62
Setting detail: THERAPIES SERIES
Discharge: HOME | End: 2022-01-03
Payer: OTHER GOVERNMENT

## 2022-01-03 PROCEDURE — 97110 THERAPEUTIC EXERCISES: CPT

## 2022-01-03 NOTE — PROGRESS NOTES
[address] [facility]  PHYSICAL THERAPY  [] EVALUATION  [x] DAILY NOTE (LAND) [] DAILY NOTE (AQUATIC ) [] PROGRESS NOTE [] DISCHARGE NOTE    [] [address] [facility]   [] [place]     [] [facility]   [] Levy Bauer    Date: 1/3/2022  Patient Name:  Bri Carrillo  : 1960  MRN: [id number]  CSN: [id number]    Referring Practitioner Gerhard Kessler DO   Diagnosis Low back pain, unspecified [M54.50]    Treatment Diagnosis Lumbosacral pain increased with sitting, standing and walking. Impaired functional mobility. Date of Evaluation 12/10/21    Additional Pertinent History Obesity, Afib history, CAD, DM, HTN, chronic low back pain, 3 stents with multiple MIs, emphysema with shortness of breath. Functional Outcome Measure Used Modified Oswestry   Functional Outcome Score 25/50 @ 50%  (12/10/21) evaluation      Insurance: Primary: Payor: Alexander Hammonds /  /  / ,   Secondary:    Authorization Information: PRE CERTIFICATION REQUIRED: NO  INSURANCE THERAPY BENEFIT:  15 VISITS FROM 12/10/21 THRU 22 UNDER AUTH# [id number], ANYTHING BEYOND 15 VISITS WOULD NEED [facility]  AQUATIC THERAPY COVERED: YES  MODALITIES COVERED:  YES  TELEHEALTH COVERED:   REFERENCE NUMBER: [id number]   Visit # 3, 3/10 for progress note   Visits Allowed: 15 through 9217   Recertification Date:    Physician Follow-Up: 2022. Physician Orders: PT evaluation and treat approved 15 visits   History of Present Illness: Patient presents with chronic back pain. Notes he moved a refrigerator 20-25 years ago that 'put his back out'. Since then has had history chiropractic adjustments, series of 3x of PT visits. Patient points to bilateral lumbosacral region for area of pain that radiates laterally from spine. Also notes shooting pain down left leg from hip to his knee that is sharp. This shooting pain comes and goes.  Notes sitting 30 minutes to 1 hour, standing initially difficult for him, stat. Standing within 10 minutes can bother his back, Difficulty sleeping due to difficulty finding a sleeping position to ease pain, also decreased activity levels with limited lifting, home activities and going out of home less frequently. He manages pain with Flexeril and Hydrocodone. Trazadone. Uses moist heating pad. Also finds that bending knees up with pillows under knees may rest a little bit easier. PT HEP has not been completed for awhile. Did complete them following previous session. Does try and walk for 30 minutes to 1 hour. Notes he had MRI in September or October of spine. SUBJECTIVE:  Patient states his Left hip is hurting today but can't think of any recent activities elevated pain is related to. Patient described pain as arthritis. Admits he has yet to start home exercises because he has been busy with recent deaths in the family and funerals. Objective:    TREATMENT   Precautions: Flexion responder, extension increases pain. 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     Pain:     X in shaded column indicates activity completed today   Modalities Parameters/  Location  Notes                     Manual Therapy Time/Technique  Notes                     Exercise/Intervention   Notes   Posterior pelvic tilt  2x10 5 sec X Over activation of glutes, cues to engage abdominals    Core control with abdominal engagement (ab brace) 10x 5 sec X    PPT with ball squeeze 15x 5 sec  X    PPT with Resisted hook-lying clam 10x 3 sec Orange X    PPT with marching 10x  X    Bridge 1x  X Unable to tolerate due to low back pain                 SKTC 5x 20 sec B  X    DKTC 5x 20 sec   X    Piriformis (knee to opposite shoulder) 5x 20 sec B  X Discomfort initially with lifting leg on Left   Supine hamstring stretch  3x 20 sec B  X Use of towel behind knee to assist                   Specific Interventions Next Treatment: US, soft tissue mobilization, therap ex for core control, flexibility in flexion program, keep in mind hip OA as well left hip>right hip. Position of comfort in supine hooklying. Does not tolerate 90/90 position or pronelying. Activity/Treatment Tolerance:  [x]  Patient tolerated treatment well  []  Patient limited by fatigue  []  Patient limited by pain   []  Patient limited by medical complications  []  Other:     Assessment: Reviewed proper technique with posterior pelvic tilts to engage abdominals and not lift buttocks off mat table. Patient attempted to complete a bridge but started to feel a muscle spasm in lower back so stopped this activity. Progressed stretches today with fairly good tolerance. Patient may benefit from use of moist heat and/or soft tissue mobilization at next appointment to assist with decreasing pain. Goals    Patient Goal: To have less back pain with improved tolerance of sitting, standing and walking, Improved activity level. Short Term Goals: 4 weeks  1. Patient to report of decreased pain levels with standing and walking from 3 up to 8-9 with standing and walking to 2-5/10.  2. Patient to demonstrate improvement with lumbar curve reversal with flexion with ease of low back pain and tightness with fingertip from floor to ankle or 2-4 inches fingertips from floor. 3. Patient to demonstrate increased core control with improve lumbar neutral awareness and increased core strength with improved stabilization with therap ex and progressions noted. 4. Patient to demonstrate increased hip ROM with SLR from 60-70 degrees to 80 degrees, Hip flexion left from 95 degrees to 110 degrees, right hip from 110 degrees to 115 degrees. 5. DEmonstrate knowledge of proper body mechanics to decrease stress and strain on his back. Long Term Goals: 8 weeks  1. Modified Oswestry from 25/50 @ 50% to 25%  2. Patient independent in HEP with follow through with exercises for pain management.  ROM, core strength, lumbar awareness, and progression in exercises. Patient Education:   [x]  HEP/Education Completed: Importance of completing HEP daily    Medbridge Access Code:  []  No new Education completed  []  Reviewed Prior HEP      [x]  Patient verbalized and/or demonstrated understanding of education provided. []  Patient unable to verbalize and/or demonstrate understanding of education provided. Will continue education. []  Barriers to learning:     PLAN:  Treatment Recommendations: Strengthening, Range of Motion, Balance Training, Functional Mobility Training, Transfer Training, Neuromuscular Re-education, Manual Therapy - Soft Tissue Mobilization, Pain Management, Home Exercise Program, Patient Education, Aquatics and Modalities    []  Plan of care initiated. Plan to see patient 2 times per week for 8 weeks to address the treatment planned outlined above.   [x]  Continue with current plan of care  []  Modify plan of care as follows:    []  Hold pending physician visit  []  Discharge    Time In 0830   Time Out 0915   Timed Code Minutes: 45 min   Total Treatment Time: 45 min     Electronically Signed by: David Simpson PTA

## 2022-01-05 ENCOUNTER — APPOINTMENT (OUTPATIENT)
Dept: PHYSICAL THERAPY | Age: 62
End: 2022-01-05
Payer: OTHER GOVERNMENT

## 2022-01-06 ENCOUNTER — HOSPITAL ENCOUNTER (OUTPATIENT)
Dept: PHYSICAL THERAPY | Age: 62
Setting detail: THERAPIES SERIES
Discharge: HOME | End: 2022-01-06
Payer: OTHER GOVERNMENT

## 2022-01-06 PROCEDURE — 97140 MANUAL THERAPY 1/> REGIONS: CPT

## 2022-01-06 PROCEDURE — 97110 THERAPEUTIC EXERCISES: CPT

## 2022-01-06 NOTE — PROGRESS NOTES
[address] [facility]  PHYSICAL THERAPY  [] EVALUATION  [x] DAILY NOTE (LAND) [] DAILY NOTE (AQUATIC ) [] PROGRESS NOTE [] DISCHARGE NOTE    [] [address] [facility]   [] [place] [address]    [] [facility]   [] Annabelle Caldera    Date: 2022  Patient Name:  Abilio Huber  : 1960  MRN: [id number]  CSN: [id number]    Referring Practitioner Herman Acevedo DO   Diagnosis Low back pain, unspecified [M54.50]    Treatment Diagnosis Lumbosacral pain increased with sitting, standing and walking. Impaired functional mobility. Date of Evaluation 12/10/21    Additional Pertinent History Obesity, Afib history, CAD, DM, HTN, chronic low back pain, 3 stents with multiple MIs, emphysema with shortness of breath. Functional Outcome Measure Used Modified Oswestry   Functional Outcome Score 25/50 @ 50%  (12/10/21) evaluation      Insurance: Primary: Payor: Milton Abernathy /  /  / ,   Secondary:    Authorization Information: PRE CERTIFICATION REQUIRED: NO  INSURANCE THERAPY BENEFIT:  15 VISITS FROM 12/10/21 THRU 22 UNDER AUTH# [id number], ANYTHING BEYOND 15 VISITS WOULD NEED [facility]AL [facility] FROM [facility]  AQUATIC THERAPY COVERED: YES  MODALITIES COVERED:  YES  TELEHEALTH COVERED:   REFERENCE NUMBER: [id number]   Visit # 4, 4/10 for progress note   Visits Allowed: 15 through 4/3/2561   Recertification Date: [id number]   Physician Follow-Up: 2022. Physician Orders: PT evaluation and treat approved 15 visits   History of Present Illness: Patient presents with chronic back pain. Notes he moved a refrigerator 20-25 years ago that 'put his back out'. Since then has had history chiropractic adjustments, series of 3x of PT visits. Patient points to bilateral lumbosacral region for area of pain that radiates laterally from spine. Also notes shooting pain down left leg from hip to his knee that is sharp. This shooting pain comes and goes.  Notes sitting 30 minutes to 1 hour, standing initially difficult for him, stat. Standing within 10 minutes can bother his back, Difficulty sleeping due to difficulty finding a sleeping position to ease pain, also decreased activity levels with limited lifting, home activities and going out of home less frequently. He manages pain with Flexeril and Hydrocodone. Trazadone. Uses moist heating pad. Also finds that bending knees up with pillows under knees may rest a little bit easier. PT HEP has not been completed for awhile. Did complete them following previous session. Does try and walk for 30 minutes to 1 hour. Notes he had MRI in September or October of spine. SUBJECTIVE:  Patient states he has been sore and rates current pain 6/10 across lower back and in L SI joint area. Reports compliance with HEP and states the stretches have been helping to decrease pain and tightness. Objective:    TREATMENT   Precautions: Flexion responder, extension increases pain. 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     Pain: 6/10 L SI joint area and lower back    X in shaded column indicates activity completed today   Modalities Parameters/  Location  Notes                     Manual Therapy Time/Technique  Notes   STM to lower lumbar paraspinals and L SI joint area 10' x Seated in chair leaning over pillows               Exercise/Intervention   Notes   Posterior pelvic tilt  2x10 5 sec X Over activation of glutes, cues to engage abdominals    Core control with abdominal engagement (ab brace) 10x 5 sec X    PPT with ball squeeze 15x 5 sec  X    PPT with Resisted hook-lying clam 10x 3 sec Orange X    PPT with marching 10x  X    Bridge 1x   Unable to tolerate due to low back pain                 SKTC 5x 20 sec B  X    DKTC 5x 20 sec   X    Piriformis (knee to opposite shoulder) 5x 20 sec B  X Discomfort initially with lifting leg on Left   Supine hamstring stretch  3x 20 sec B  X Use of towel behind knee to assist Specific Interventions Next Treatment: US, soft tissue mobilization, therap ex for core control, flexibility in flexion program, keep in mind hip OA as well left hip>right hip. Position of comfort in supine hooklying. Does not tolerate 90/90 position or pronelying. Activity/Treatment Tolerance:  [x]  Patient tolerated treatment well  []  Patient limited by fatigue  []  Patient limited by pain   []  Patient limited by medical complications  []  Other:     Assessment: Patient completed exercises as listed above working on ROM and core strength. Cues provided for correct ab bracing and pelvic tilt technique. Initiated STM to lower lumbar paraspinals to decrease muscle tightness. Patient felt relief after STM today. Will continue to progress as tolerated by patient per POC. Goals    Patient Goal: To have less back pain with improved tolerance of sitting, standing and walking, Improved activity level. Short Term Goals: 4 weeks  1. Patient to report of decreased pain levels with standing and walking from 3 up to 8-9 with standing and walking to 2-5/10.  2. Patient to demonstrate improvement with lumbar curve reversal with flexion with ease of low back pain and tightness with fingertip from floor to ankle or 2-4 inches fingertips from floor. 3. Patient to demonstrate increased core control with improve lumbar neutral awareness and increased core strength with improved stabilization with therap ex and progressions noted. 4. Patient to demonstrate increased hip ROM with SLR from 60-70 degrees to 80 degrees, Hip flexion left from 95 degrees to 110 degrees, right hip from 110 degrees to 115 degrees. 5. DEmonstrate knowledge of proper body mechanics to decrease stress and strain on his back. Long Term Goals: 8 weeks  1. Modified Oswestry from 25/50 @ 50% to 25%  2. Patient independent in [facility] with follow through with exercises for pain management.  ROM, core strength, lumbar awareness, and progression in exercises. Patient Education:   [x]  HEP/Education Completed: Importance of completing HEP daily    Medbridge Access Code:  []  No new Education completed  []  Reviewed Prior HEP      [x]  Patient verbalized and/or demonstrated understanding of education provided. []  Patient unable to verbalize and/or demonstrate understanding of education provided. Will continue education. []  Barriers to learning:     PLAN:  Treatment Recommendations: Strengthening, Range of Motion, Balance Training, Functional Mobility Training, Transfer Training, Neuromuscular Re-education, Manual Therapy - Soft Tissue Mobilization, Pain Management, Home Exercise Program, Patient Education, Aquatics and Modalities    []  Plan of care initiated. Plan to see patient 2 times per week for 8 weeks to address the treatment planned outlined above.   [x]  Continue with current plan of care  []  Modify plan of care as follows:    []  Hold pending physician visit  []  Discharge    Time In 0835   Time Out 0915   Timed Code Minutes: 40 min   Total Treatment Time: 40 min     Electronically Signed by: Paradise Moreno PTA

## 2022-01-10 ENCOUNTER — HOSPITAL ENCOUNTER (OUTPATIENT)
Dept: PHYSICAL THERAPY | Age: 62
Setting detail: THERAPIES SERIES
Discharge: HOME | End: 2022-01-10
Payer: OTHER GOVERNMENT

## 2022-01-10 PROCEDURE — 97110 THERAPEUTIC EXERCISES: CPT

## 2022-01-10 NOTE — PROGRESS NOTES
[address] [facility]  PHYSICAL THERAPY  [] EVALUATION  [x] DAILY NOTE (LAND) [] DAILY NOTE (AQUATIC ) [] PROGRESS NOTE [] DISCHARGE NOTE    [] [address] [facility]   [] [address]    [] [facility]   [] Rebecca Crystal    Date: 1/10/2022  Patient Name:  Rima Oquendo  : 1960  MRN: [id number]  CSN: [id number]    Referring Practitioner Jah Gill DO   Diagnosis Low back pain, unspecified [M54.50]    Treatment Diagnosis Lumbosacral pain increased with sitting, standing and walking. Impaired functional mobility. Date of Evaluation 12/10/21    Additional Pertinent History Obesity, Afib history, CAD, DM, HTN, chronic low back pain, 3 stents with multiple MIs, emphysema with shortness of breath. Functional Outcome Measure Used Modified Oswestry   Functional Outcome Score 25/50 @ 50%  (12/10/21) evaluation      Insurance: Primary: Payor: Patricia Gamez /  /  / ,   Secondary:    Authorization Information: PRE CERTIFICATION REQUIRED: NO  INSURANCE THERAPY BENEFIT:  15 VISITS FROM 12/10/21 THRU 22 UNDER AUTH# [id number], ANYTHING BEYOND 15 VISITS WOULD NEED [facility]  AQUATIC THERAPY COVERED: YES  MODALITIES COVERED:  YES  TELEHEALTH COVERED:   REFERENCE NUMBER: [id number]   Visit # 5, 5/10 for progress note   Visits Allowed: 15 through 3/3/3846   Recertification Date: [id number]   Physician Follow-Up: 2022. Physician Orders: PT evaluation and treat approved 15 visits   History of Present Illness: Patient presents with chronic back pain. Notes he moved a refrigerator 20-25 years ago that 'put his back out'. Since then has had history chiropractic adjustments, series of 3x of PT visits. Patient points to bilateral lumbosacral region for area of pain that radiates laterally from spine. Also notes shooting pain down left leg from hip to his knee that is sharp. This shooting pain comes and goes.  Notes sitting 30 minutes to 1 hour, standing initially difficult for him, stat. Standing within 10 minutes can bother his back, Difficulty sleeping due to difficulty finding a sleeping position to ease pain, also decreased activity levels with limited lifting, home activities and going out of home less frequently. He manages pain with Flexeril and Hydrocodone. Trazadone. Uses moist heating pad. Also finds that bending knees up with pillows under knees may rest a little bit easier. PT HEP has not been completed for awhile. Did complete them following previous session. Does try and walk for 30 minutes to 1 hour. Notes he had MRI in September or October of spine. SUBJECTIVE:  Patient states he woke up hurting more yesterday and would have rated his pain 8/10 but is feeling better today. Patient needed to use his cane yesterday because of the pain. Pain today is across the lower back today. Patient had some relief from soft tissue work last therapy session and for a short period afterward but no long term relief. Patient is using heat at home and reports compliance with HEP. Objective:    TREATMENT   Precautions: Flexion responder, extension increases pain. 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     Pain: 3-4/10 Left SI joint area and lower back    X in shaded column indicates activity completed today   Modalities Parameters/  Location  Notes                     Manual Therapy Time/Technique  Notes   STM to lower lumbar paraspinals and L SI joint area 10 minutes  Seated in chair leaning over pillows               Exercise/Intervention   Notes   NuStep (seat 10/arms 11)  Level 4 6 minutes X Warm-up          Posterior pelvic tilt  2x10 5 sec X Over activation of glutes, cues to engage abdominals    Core control with abdominal engagement (ab brace) 10x 5 sec X    PPT with ball squeeze 15x 5 sec  X    PPT with Resisted hook-lying clam (Green) 2x10     5 sec X    PPT with marching 10x  X    Bridge 5x  X Limited range and limited reps to avoid pain. SKTC 5x 20 sec B  X    DKTC 5x 20 sec   X    Piriformis (knee to opposite shoulder) 5x 20 sec B  X Discomfort initially with lifting leg on Left   Supine hamstring stretch  3x 20 sec B  X Use of towel behind knee to assist                   Specific Interventions Next Treatment: US, soft tissue mobilization, therap ex for core control, flexibility in flexion program, keep in mind hip OA as well left hip>right hip. Position of comfort in supine hooklying. Does not tolerate 90/90 position or pronelying. Activity/Treatment Tolerance:  [x]  Patient tolerated treatment well  []  Patient limited by fatigue  []  Patient limited by pain   []  Patient limited by medical complications  []  Other:     Assessment:  Patient does well with exercises as listed above. Cues needed at times for correct technique with core stabilization exercises. Added Nustep today and increased repetitions as able. Patient was able to complete small range bridges today without increasing pain. Patient requested to leave 5 minutes early because he has another appointment scheduled following therapy. No complaints of increased pain noted today. Goals    Patient Goal: To have less back pain with improved tolerance of sitting, standing and walking, Improved activity level. Short Term Goals: 4 weeks  1. Patient to report of decreased pain levels with standing and walking from 3 up to 8-9 with standing and walking to 2-5/10.  2. Patient to demonstrate improvement with lumbar curve reversal with flexion with ease of low back pain and tightness with fingertip from floor to ankle or 2-4 inches fingertips from floor. 3. Patient to demonstrate increased core control with improve lumbar neutral awareness and increased core strength with improved stabilization with therap ex and progressions noted.   4. Patient to demonstrate increased hip ROM with SLR from 60-70 degrees to 80 degrees, Hip flexion left from 95 degrees to 110 degrees, right hip from 110 degrees to 115 degrees. 5. DEmonstrate knowledge of proper body mechanics to decrease stress and strain on his back. Long Term Goals: 8 weeks  1. Modified Oswestry from 25/50 @ 50% to 25%  2. Patient independent in HEP with follow through with exercises for pain management. ROM, core strength, lumbar awareness, and progression in exercises. Patient Education:   [x]  HEP/Education Completed: Importance of completing HEP daily, use of heat and ice   Medbridge Access Code:  []  No new Education completed  []  Reviewed Prior HEP      [x]  Patient verbalized and/or demonstrated understanding of education provided. []  Patient unable to verbalize and/or demonstrate understanding of education provided. Will continue education. []  Barriers to learning:     PLAN:  Treatment Recommendations: Strengthening, Range of Motion, Balance Training, Functional Mobility Training, Transfer Training, Neuromuscular Re-education, Manual Therapy - Soft Tissue Mobilization, Pain Management, Home Exercise Program, Patient Education, Aquatics and Modalities    []  Plan of care initiated. Plan to see patient 2 times per week for 8 weeks to address the treatment planned outlined above.   [x]  Continue with current plan of care  []  Modify plan of care as follows:    []  Hold pending physician visit  []  Discharge    Time In 7233   Time Out 0910   Timed Code Minutes: 38 min   Total Treatment Time: 38 min     Electronically Signed by: Julian David PTA

## 2022-01-12 ENCOUNTER — HOSPITAL ENCOUNTER (OUTPATIENT)
Dept: PHYSICAL THERAPY | Age: 62
Setting detail: THERAPIES SERIES
Discharge: HOME | End: 2022-01-12
Payer: OTHER GOVERNMENT

## 2022-01-12 PROCEDURE — 97110 THERAPEUTIC EXERCISES: CPT

## 2022-01-12 PROCEDURE — 97124 MASSAGE THERAPY: CPT

## 2022-01-12 NOTE — PROGRESS NOTES
sitting 30 minutes to 1 hour, standing initially difficult for him, stat. Standing within 10 minutes can bother his back, Difficulty sleeping due to difficulty finding a sleeping position to ease pain, also decreased activity levels with limited lifting, home activities and going out of home less frequently. He manages pain with Flexeril and Hydrocodone. Trazadone. Uses moist heating pad. Also finds that bending knees up with pillows under knees may rest a little bit easier. PT HEP has not been completed for awhile. Did complete them following previous session. Does try and walk for 30 minutes to 1 hour. Notes he had MRI in September or October of spine. SUBJECTIVE:  Patient reports that his back is not as bad as it was. Pain rating 4/10. Pain remains mainly on left side of low back. Exercises helping a little so far but not making his back pain any worse. Having Afib today and heart rate goes to elevated 111 bpm to 80 bpm. Feeling flutter in his chest. Denies of angina. Took medication this morning for his afib and pain medication. Objective:    TREATMENT   Precautions: Flexion responder, extension increases pain. 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     Pain: 4/10 Left SI joint area and lower back    X in shaded column indicates activity completed today   Modalities Parameters/  Location  Notes   HP on low back while completing mat exercises 23 minutes x                Manual Therapy Time/Technique  Notes   STM to lower lumbar paraspinals and L SI joint area 10 minutes x Seated in chair leaning over pillows               Exercise/Intervention   Notes   NuStep (seat 10/arms 11)  Level 4 6 minutes  Warm-up          Posterior pelvic tilt  10x 5 sec X Over activation of glutes, cues to engage abdominals    Core control with abdominal engagement (ab brace) 10x 5 sec     PPT with ball squeeze 15x 5 sec  x    PPT with Resisted hook-lying clam (Green) 2x10     5 sec x    PPT with marching 10x  x    Bridge 5x Limited range and limited reps to avoid pain. SKTC 5x 20 sec B  X    DKTC 3x 20 sec   X    Piriformis (knee to opposite shoulder) 3x 20 sec B  X    Supine hamstring stretch  3x 20 sec B  X Use of towel behind knee to assist                   Specific Interventions Next Treatment: US, soft tissue mobilization, therap ex for core control, flexibility in flexion program, keep in mind hip OA as well left hip>right hip. Position of comfort in supine hooklying. Does not tolerate 90/90 position or pronelying. Activity/Treatment Tolerance: mild erythema from HP and massage noted at back. [x]  Patient tolerated treatment well  []  Patient limited by fatigue  []  Patient limited by pain   []  Patient limited by medical complications  []  Other:     Assessment:  Held on Nustep due to patient stating he is in afib. Asked patient if he had contacted his physician. Patient did not feel he needed to go to ER and stated that his afib reoccurs at times. He manages it with medication. Exercises completed per patient care ex record above. Cues to not contract gluts and lift hips with pelvic tilts and hip adduction with ball. Did end with soft tissue mobilization at end of session to bilateral lumbar region. Noting improved ROM with SKTC, Good response with flexion program and avoidng extension past neutral.      Goals    Patient Goal: To have less back pain with improved tolerance of sitting, standing and walking, Improved activity level. Short Term Goals: 4 weeks  1. Patient to report of decreased pain levels with standing and walking from 3 up to 8-9 with standing and walking to 2-5/10.  2. Patient to demonstrate improvement with lumbar curve reversal with flexion with ease of low back pain and tightness with fingertip from floor to ankle or 2-4 inches fingertips from floor.    3. Patient to demonstrate increased core control with improve lumbar neutral awareness and increased core strength with improved stabilization with therap ex and progressions noted. 4. Patient to demonstrate increased hip ROM with SLR from 60-70 degrees to 80 degrees, Hip flexion left from 95 degrees to 110 degrees, right hip from 110 degrees to 115 degrees. 5. DEmonstrate knowledge of proper body mechanics to decrease stress and strain on his back. Long Term Goals: 8 weeks  1. Modified Oswestry from 25/50 @ 50% to 25%  2. Patient independent in HEP with follow through with exercises for pain management. ROM, core strength, lumbar awareness, and progression in exercises. Patient Education:   [x]  HEP/Education Completed: Importance of completing HEP daily, use of heat and ice   Medbridge Access Code:  []  No new Education completed  []  Reviewed Prior HEP      [x]  Patient verbalized and/or demonstrated understanding of education provided. []  Patient unable to verbalize and/or demonstrate understanding of education provided. Will continue education. []  Barriers to learning:     PLAN:  Treatment Recommendations: Strengthening, Range of Motion, Balance Training, Functional Mobility Training, Transfer Training, Neuromuscular Re-education, Manual Therapy - Soft Tissue Mobilization, Pain Management, Home Exercise Program, Patient Education, Aquatics and Modalities    []  Plan of care initiated. Plan to see patient 2 times per week for 8 weeks to address the treatment planned outlined above.   [x]  Continue with current plan of care  []  Modify plan of care as follows:    []  Hold pending physician visit  []  Discharge    Time In 0702   Time Out 0734   Timed Code Minutes: 32   Total Treatment Time: 32     Electronically Signed by: Mira Oakley PT

## 2022-01-17 ENCOUNTER — HOSPITAL ENCOUNTER (OUTPATIENT)
Dept: PHYSICAL THERAPY | Age: 62
Setting detail: THERAPIES SERIES
Discharge: HOME | End: 2022-01-17
Payer: OTHER GOVERNMENT

## 2022-01-17 PROCEDURE — 97110 THERAPEUTIC EXERCISES: CPT

## 2022-01-17 NOTE — PROGRESS NOTES
[facility]  PHYSICAL THERAPY  [] EVALUATION  [x] DAILY NOTE (LAND) [] DAILY NOTE (AQUATIC ) [] PROGRESS NOTE [] DISCHARGE NOTE    [] [facility]   [] [address]    [] [facility]   [] Rebecca Crystal    Date: 2022  Patient Name:  Rima Oquendo  : 1960  MRN: [id number]  CSN: [id number]    Referring Practitioner Jah Gill DO   Diagnosis Low back pain, unspecified [M54.50]    Treatment Diagnosis Lumbosacral pain increased with sitting, standing and walking. Impaired functional mobility. Date of Evaluation 12/10/21    Additional Pertinent History Obesity, Afib history, CAD, DM, HTN, chronic low back pain, 3 stents with multiple MIs, emphysema with shortness of breath. Functional Outcome Measure Used Modified Oswestry   Functional Outcome Score 25/50 @ 50%  (12/10/21) evaluation      Insurance: Primary: Payor: Patricia Gamez /  /  / ,   Secondary:    Authorization Information: PRE CERTIFICATION REQUIRED: NO  INSURANCE THERAPY BENEFIT:  15 VISITS FROM 12/10/21 THRU 22 UNDER AUTH# [id number], ANYTHING BEYOND 15 VISITS WOULD NEED [facility]AL [facility] FROM [facility]  AQUATIC THERAPY COVERED: YES  MODALITIES COVERED:  YES  TELEHEALTH COVERED:   REFERENCE NUMBER: [id number]   Visit # 7, 7/10 for progress note   Visits Allowed: 15 through    Recertification Date: 1/3/7746   Physician Follow-Up: 2022. Physician Orders: PT evaluation and treat approved 15 visits   History of Present Illness: Patient presents with chronic back pain. Notes he moved a refrigerator 20-25 years ago that 'put his back out'. Since then has had history chiropractic adjustments, series of 3x of PT visits. Patient points to bilateral lumbosacral region for area of pain that radiates laterally from spine. Also notes shooting pain down left leg from hip to his knee that is sharp. This shooting pain comes and goes.  Notes sitting 30 minutes to 1 hour, standing initially difficult for him, stat. Standing within 10 minutes can bother his back, Difficulty sleeping due to difficulty finding a sleeping position to ease pain, also decreased activity levels with limited lifting, home activities and going out of home less frequently. He manages pain with Flexeril and Hydrocodone. Trazadone. Uses moist heating pad. Also finds that bending knees up with pillows under knees may rest a little bit easier. PT HEP has not been completed for awhile. Did complete them following previous session. Does try and walk for 30 minutes to 1 hour. Notes he had MRI in September or October of spine. SUBJECTIVE:  Patient reports his Afib symptoms come and go. Denies flutter in his chest and shortness of breath this morning. Patient is doing exercises at home daily. Patient feels good this morning. Patient reports he does not get more than 1 hour of relief from soft tissue mobilization. Objective:    TREATMENT   Precautions: Flexion responder, extension increases pain. 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 Pain: 3-4/10 Left SI joint area and lower back    X in shaded column indicates activity completed today   Modalities Parameters/  Location  Notes   HP on low back while completing mat exercises 23 minutes                 Manual Therapy Time/Technique  Notes   STM to lower lumbar paraspinals and L SI joint area 10 minutes  Seated in chair leaning over pillows               Exercise/Intervention   Notes   NuStep (seat 10/arms 11)  Level 4 6 minutes X Warm-up          Posterior pelvic tilt  15x 5 sec X Over activation of glutes, cues to engage abdominals    Core control with abdominal engagement (ab brace) 10x 5 sec X    PPT with ball squeeze 15x 5 sec  X    PPT with Resisted hook-lying clam (Green) 2x10     5 sec X    PPT with marching 15x  X    Bridge 5x   Limited range and limited reps to avoid pain.                  SKTC 5x 20 sec B  X    DKTC 3x 20 sec   X    Piriformis (knee to opposite shoulder) 3x 20 sec B  X    Supine hamstring stretch  3x 20 sec B  X Use of towel behind knee to assist          Standing:  Heel raises, marching 10x  X Cues to maintain abdominal bracing                     Specific Interventions Next Treatment: US, soft tissue mobilization, therap ex for core control, flexibility in flexion program, keep in mind hip OA as well left hip>right hip. Position of comfort in supine hooklying. Does not tolerate 90/90 position or pronelying. Activity/Treatment Tolerance:   [x]  Patient tolerated treatment well  []  Patient limited by fatigue  []  Patient limited by pain   []  Patient limited by medical complications  []  Other:     Assessment:  Patient tolerated activities fairly well today with pain levels remaining the same at conclusion of session. Held manual work today per patient due to not getting any long term relief. Patient tolerated NuStep without having any Afib symptoms. Initiated standing exercises with fairly good tolerance. Mild popping at Left hip when marching too hip. Goals    Patient Goal: To have less back pain with improved tolerance of sitting, standing and walking, Improved activity level. Short Term Goals: 4 weeks  1. Patient to report of decreased pain levels with standing and walking from 3 up to 8-9 with standing and walking to 2-5/10.  2. Patient to demonstrate improvement with lumbar curve reversal with flexion with ease of low back pain and tightness with fingertip from floor to ankle or 2-4 inches fingertips from floor. 3. Patient to demonstrate increased core control with improve lumbar neutral awareness and increased core strength with improved stabilization with therap ex and progressions noted. 4. Patient to demonstrate increased hip ROM with SLR from 60-70 degrees to 80 degrees, Hip flexion left from 95 degrees to 110 degrees, right hip from 110 degrees to 115 degrees.    5. DEmonstrate knowledge of proper body mechanics to decrease stress and strain on his back. Long Term Goals: 8 weeks  1. Modified Oswestry from 25/50 @ 50% to 25%  2. Patient independent in HEP with follow through with exercises for pain management. ROM, core strength, lumbar awareness, and progression in exercises. Patient Education:   [x]  HEP/Education Completed: Importance of completing HEP daily, use of heat and ice   Medbridge Access Code:  []  No new Education completed  []  Reviewed Prior HEP      [x]  Patient verbalized and/or demonstrated understanding of education provided. []  Patient unable to verbalize and/or demonstrate understanding of education provided. Will continue education. []  Barriers to learning:     PLAN:  Treatment Recommendations: Strengthening, Range of Motion, Balance Training, Functional Mobility Training, Transfer Training, Neuromuscular Re-education, Manual Therapy - Soft Tissue Mobilization, Pain Management, Home Exercise Program, Patient Education, Aquatics and Modalities    []  Plan of care initiated. Plan to see patient 2 times per week for 8 weeks to address the treatment planned outlined above.   [x]  Continue with current plan of care  []  Modify plan of care as follows:    []  Hold pending physician visit  []  Discharge    Time In 0830   Time Out 0915   Timed Code Minutes: 40 min   Total Treatment Time: 40 min     Electronically Signed by: Judie Rand PTA

## 2022-01-19 ENCOUNTER — HOSPITAL ENCOUNTER (OUTPATIENT)
Dept: PHYSICAL THERAPY | Age: 62
Setting detail: THERAPIES SERIES
Discharge: HOME | End: 2022-01-19
Payer: OTHER GOVERNMENT

## 2022-01-19 PROCEDURE — 97110 THERAPEUTIC EXERCISES: CPT

## 2022-01-19 NOTE — PROGRESS NOTES
[address] [facility]  PHYSICAL THERAPY  [] EVALUATION  [] DAILY NOTE (LAND) [] DAILY NOTE (AQUATIC ) [x] PROGRESS NOTE [] DISCHARGE NOTE    [x] [facility] [place]   [] [place] [address]    [] [facility]   [] Melody Martínez    Date: 2022  Patient Name:  Tamela Booth  : 1960  MRN: [id number]  CSN: [id number]    Referring Practitioner Delilah Worthington DO   Diagnosis Low back pain, unspecified [M54.50]    Treatment Diagnosis Lumbosacral pain increased with sitting, standing and walking. Impaired functional mobility. Date of Evaluation 12/10/21    Additional Pertinent History Obesity, Afib history, CAD, DM, HTN, chronic low back pain, 3 stents with multiple MIs, emphysema with shortness of breath. Functional Outcome Measure Used Modified Oswestry   Functional Outcome Score 25/50 @ 50%  (12/10/21) evaluation  25/50 @ 50%  ( 22)  Progress note      Insurance: Primary: Payor: Jacob Luke /  /  / ,   Secondary:    Authorization Information: PRE CERTIFICATION REQUIRED: NO  INSURANCE THERAPY BENEFIT:  15 VISITS FROM 12/10/21 THRU 22 UNDER AUTH# [id number], ANYTHING BEYOND 15 VISITS WOULD NEED [facility]  AQUATIC THERAPY COVERED: YES  MODALITIES COVERED:  YES  TELEHEALTH COVERED:   REFERENCE NUMBER: [id number]   Visit # 8,  for progress note   Visits Allowed: 15 through    Recertification Date: [id number]   Physician Follow-Up: 2022. Physician Orders: PT evaluation and treat approved 15 visits   History of Present Illness: Patient presents with chronic back pain. Notes he moved a refrigerator 20-25 years ago that 'put his back out'. Since then has had history chiropractic adjustments, series of 3x of PT visits. Patient points to bilateral lumbosacral region for area of pain that radiates laterally from spine. Also notes shooting pain down left leg from hip to his knee that is sharp. This shooting pain comes and goes. Notes sitting 30 minutes to 1 hour, standing initially difficult for him, stat. Standing within 10 minutes can bother his back, Difficulty sleeping due to difficulty finding a sleeping position to ease pain, also decreased activity levels with limited lifting, home activities and going out of home less frequently. He manages pain with Flexeril and Hydrocodone. Trazadone. Uses moist heating pad. Also finds that bending knees up with pillows under knees may rest a little bit easier. PT HEP has not been completed for awhile. Did complete them following previous session. Does try and walk for 30 minutes to 1 hour. Notes he had MRI in September or October of spine. SUBJECTIVE:  Patient reports that PT has been helpful so far. Patient notes that he is a little more flexible. Does stretches during day. Noting not a difference with decreased pain with standing and walking. Objective:    TREATMENT   Precautions: Flexion responder, extension increases pain. 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 Pain: 3-4/10 Left SI joint area and lower back    X in shaded column indicates activity completed today   Modalities Parameters/  Location  Notes   HP on low back while completing mat exercises 23 minutes x                Manual Therapy Time/Technique  Notes   STM to lower lumbar paraspinals and L SI joint area 10 minutes  Seated in chair leaning over pillows               Exercise/Intervention   Notes   NuStep (seat 10/arms 11)  Level 4 6 minutes  Warm-up          Posterior pelvic tilt  15x 5 sec x Over activation of glutes, cues to engage abdominals    Core control with abdominal engagement (ab brace) 10x 5 sec     PPT with ball squeeze 15x 5 sec  x    PPT with Resisted hook-lying clam (Green) 15x    5 sec x    PPT with marching 15x  x    Bridge 5x   Limited range and limited reps to avoid pain.                  SKTC 5x 20 sec B  x    DKTC 3x 20 sec   x    Piriformis (knee to opposite shoulder) 3x 20 sec B  x    Supine hamstring stretch  3x 20 sec B  x Use of towel behind knee to assist          Standing:  Heel raises, marching 10x  x Cues to maintain abdominal bracing            Reassessment 17 min  x REfer to goals summary for status. Specific Interventions Next Treatment: US, soft tissue mobilization, therap ex for core control, flexibility in flexion program, keep in mind hip OA as well left hip>right hip. Position of comfort in supine hooklying. Does not tolerate 90/90 position or pronelying. Activity/Treatment Tolerance:   [x]  Patient tolerated treatment well  []  Patient limited by fatigue  []  Patient limited by pain   []  Patient limited by medical complications  []  Other:     Assessment:   Reassessment today. Refer to goal summary for status. Patient is progressing in conservative core strengthening progam, flexibility program. Have progressed to standing stabilization ex. Goals continue to be in progress for managing back pain. Flexibility improving at bilateral hips and low back. RLE hip flexibility goals met. RLE continue to work on flexibility Core strengthening has increased to 15 reps. Will continue with PT for 2x per week for 4 more weeks in order to progress with ex and meet goals. Goals    Patient Goal: To have less back pain with improved tolerance of sitting, standing and walking, Improved activity level. GOAL NOT MET sitting tolerance limited to 5-20 minutes, standing/walking limited to 20-30 min for standing, 30-45 minutes for walking. Before pain increases to a point that he has to sit down. Short Term Goals: 4 weeks  1. Patient to report of decreased pain levels with standing and walking from 3 up to 8-9 with standing and walking to 2-5/10. GOAL NOT MET Pain levels remains the same 3-8-9/10. He does feel better after therapy and can last a few hours.     2. Patient to demonstrate improvement with lumbar curve reversal with flexion with ease of low back pain and tightness with fingertip from floor to ankle or 2-4 inches fingertips from floor. GOAL NOT MET Flexion to distal patellar level with fingertips from floor. Continues to be restricted and tightness at lumbar region. 3. Patient to demonstrate increased core control with improve lumbar neutral awareness and increased core strength with improved stabilization with therap ex and progressions noted. ONGOING. Patient has been initiated in conservative program of core engagement and strengthening ex. LImited progression but was able to initiate standing stabilization in last session. 4. Patient to demonstrate increased hip ROM with SLR from 60-70 degrees to 80 degrees, Hip flexion left from 95 degrees to 110 degrees, right hip from 110 degrees to 115 degrees. GOAL NOT MET LEFT SLR 70 degrees (goal not met)  right SLR 88 degree(goal met) , SKTC hip flexion left 100 degrees (goal not met), Right hip 120 degrees (goal met)    5. DEmonstrate knowledge of proper body mechanics to decrease stress and strain on his back. ONGOING review of body mechanics. Patient cues for posture and limiting flexion forward at spine. Long Term Goals: 8 weeks  1. Modified Oswestry from 25/50 @ 50% to 25%  GOAL NOT MET Oswestry remained 25/50 @ 50%    2. Patient independent in HEP with follow through with exercises for pain management. ROM, core strength, lumbar awareness, and progression in exercises. ONGOING Continues with conservative program and plans to progress further in the ex program for flexibility and core strengthening. Patient Education:   [x]  HEP/Education Completed: Importance of completing HEP daily, use of heat and ice   Medbridge Access Code:  []  No new Education completed  []  Reviewed Prior HEP      [x]  Patient verbalized and/or demonstrated understanding of education provided. []  Patient unable to verbalize and/or demonstrate understanding of education provided.   Will continue education. []  Barriers to learning:     PLAN:  Treatment Recommendations: Strengthening, Range of Motion, Balance Training, Functional Mobility Training, Transfer Training, Neuromuscular Re-education, Manual Therapy - Soft Tissue Mobilization, Pain Management, Home Exercise Program, Patient Education, Aquatics and Modalities    []  Plan of care initiated. Plan to see patient 2 times per week for 8 weeks to address the treatment planned outlined above.   [x]  Continue with current plan of care  []  Modify plan of care as follows:    []  Hold pending physician visit  []  Discharge    Time In [id number]   Time Out 0730   Timed Code Minutes: 23   Total Treatment Time: 35     Electronically Signed by: Russell Medina PT

## 2022-01-26 ENCOUNTER — APPOINTMENT (OUTPATIENT)
Dept: PHYSICAL THERAPY | Age: 62
End: 2022-01-26
Payer: OTHER GOVERNMENT

## 2022-01-28 ENCOUNTER — HOSPITAL ENCOUNTER (OUTPATIENT)
Dept: PHYSICAL THERAPY | Age: 62
Setting detail: THERAPIES SERIES
Discharge: HOME | End: 2022-01-28
Payer: OTHER GOVERNMENT

## 2022-01-28 PROCEDURE — 97110 THERAPEUTIC EXERCISES: CPT

## 2022-01-28 NOTE — PROGRESS NOTES
[address] [facility]  PHYSICAL THERAPY  [] EVALUATION  [x] DAILY NOTE (LAND) [] DAILY NOTE (AQUATIC ) [] PROGRESS NOTE [] DISCHARGE NOTE    [x] OUTPATIENT REHABILITATION CENTER [facility]   [] [place] [address]    [] [facility]   [] Annabelle Caldera    Date: 2022  Patient Name:  Abilio Huber  : 1960  MRN: [id number]  CSN: [id number]    Referring Practitioner Herman Acevedo DO   Diagnosis Low back pain, unspecified [M54.50]    Treatment Diagnosis Lumbosacral pain increased with sitting, standing and walking. Impaired functional mobility. Date of Evaluation 12/10/21    Additional Pertinent History Obesity, Afib history, CAD, DM, HTN, chronic low back pain, 3 stents with multiple MIs, emphysema with shortness of breath. Functional Outcome Measure Used Modified Oswestry   Functional Outcome Score 25/50 @ 50%  (12/10/21) evaluation  25/50 @ 50%  ( 22)  Progress note      Insurance: Primary: Payor: Milton Abernathy /  /  / ,   Secondary:    Authorization Information: PRE CERTIFICATION REQUIRED: NO  INSURANCE THERAPY BENEFIT:  15 VISITS FROM 12/10/21 THRU 22 UNDER AUTH# [id number], ANYTHING BEYOND 15 VISITS WOULD NEED [facility] FROM [facility]  AQUATIC THERAPY COVERED: YES  MODALITIES COVERED:  YES  TELEHEALTH COVERED:   REFERENCE NUMBER: [id number]   Visit # 9,  for progress note   Visits Allowed: 15 through    Recertification Date: 499   Physician Follow-Up: 2022. Physician Orders: PT evaluation and treat approved 15 visits   History of Present Illness: Patient presents with chronic back pain. Notes he moved a refrigerator 20-25 years ago that 'put his back out'. Since then has had history chiropractic adjustments, series of 3x of PT visits. Patient points to bilateral lumbosacral region for area of pain that radiates laterally from spine. Also notes shooting pain down left leg from hip to his knee that is sharp. This shooting pain comes and goes. Notes sitting 30 minutes to 1 hour, standing initially difficult for him, stat. Standing within 10 minutes can bother his back, Difficulty sleeping due to difficulty finding a sleeping position to ease pain, also decreased activity levels with limited lifting, home activities and going out of home less frequently. He manages pain with Flexeril and Hydrocodone. Trazadone. Uses moist heating pad. Also finds that bending knees up with pillows under knees may rest a little bit easier. PT HEP has not been completed for awhile. Did complete them following previous session. Does try and walk for 30 minutes to 1 hour. Notes he had MRI in September or October of spine. SUBJECTIVE:  Patient notes he did a lot of shopping the other day with his wife and did fine, but later that evening he was in the kitchen making dinner and stood for about 20 minutes and this seemed to really aggravate his low back and rates 9/10. Patient took his pain medication. Patient describes the pain as sharp. Patient notes pain is about 3/10. Objective:    TREATMENT   Precautions: Flexion responder, extension increases pain. 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     Pain: 3-4/10 Left SI joint area and lower back    X in shaded column indicates activity completed today   Modalities Parameters/  Location  Notes   HP on low back while completing mat exercises 23 minutes                 Manual Therapy Time/Technique  Notes   STM to lower lumbar paraspinals and L SI joint area 10 minutes  Seated in chair leaning over pillows               Exercise/Intervention   Notes   NuStep (seat 10/arms 11)  Level 4 5 minutes X           Posterior pelvic tilt  15x 5 sec x Over activation of glutes, cues to engage abdominals    Core control with abdominal engagement (ab brace) 10x 5 sec     PPT with ball squeeze 15x 5 sec      PPT with Resisted hook-lying clam (Green) 15x    5 sec     PPT with marching 15x  x    Bridge 5x  X Limited range and limited reps to avoid pain. SKTC 5x 20 sec B  x    DKTC 2x 30 sec   x    Piriformis (knee to opposite shoulder) 2x 30 sec B  x    Supine hamstring stretch  3x 20 sec B   Use of towel behind knee to assist          Standing:  Heel raises, marching, HS curls 15x  X Cues to maintain abdominal bracing     Mini squats  10x  X    Standing hip flexion, hip abduction 2x5x  X    Standing HS stretch 2x20\"  X           Reassessment 17 min   REfer to goals summary for status. Specific Interventions Next Treatment: US, soft tissue mobilization, therap ex for core control, flexibility in flexion program, keep in mind hip OA as well left hip>right hip. Position of comfort in supine hooklying. Does not tolerate 90/90 position or pronelying. Activity/Treatment Tolerance:   [x]  Patient tolerated treatment well  []  Patient limited by fatigue  []  Patient limited by pain   []  Patient limited by medical complications  []  Other:     Assessment:  Initiated standing progressions this date with cues to maintain neutral spine position and minimize hip drop when in single limb support. Patient is able to tolerate today's progressions well. Patient rates pain at conclusion 3/10 which is comparable to beginning of treatment session. Goals    Patient Goal: To have less back pain with improved tolerance of sitting, standing and walking, Improved activity level. Short Term Goals: 4 weeks  1. Patient to report of decreased pain levels with standing and walking from 3 up to 8-9 with standing and walking to 2-5/10.    2. Patient to demonstrate improvement with lumbar curve reversal with flexion with ease of low back pain and tightness with fingertip from floor to ankle or 2-4 inches fingertips from floor.        3. Patient to demonstrate increased core control with improve lumbar neutral awareness and increased core strength with improved stabilization with therap ex and progressions noted.  .    4. Patient to demonstrate increased hip ROM with SLR from 60-70 degrees to 80 degrees, Hip flexion left from 95 degrees to 110 degrees, right hip from 110 degrees to 115 degrees. 5. DEmonstrate knowledge of proper body mechanics to decrease stress and strain on his back. Long Term Goals: 8 weeks  1. Modified Oswestry from 25/50 @ 50% to 25%    2. Patient independent in HEP with follow through with exercises for pain management. ROM, core strength, lumbar awareness, and progression in exercises. Patient Education:   []  HEP/Education Completed: Importance of completing HEP daily, use of heat and ice   Medbridge Access Code:  []  No new Education completed  [x]  Reviewed Prior HEP      [x]  Patient verbalized and/or demonstrated understanding of education provided. []  Patient unable to verbalize and/or demonstrate understanding of education provided. Will continue education. []  Barriers to learning:     PLAN:  Treatment Recommendations: Strengthening, Range of Motion, Balance Training, Functional Mobility Training, Transfer Training, Neuromuscular Re-education, Manual Therapy - Soft Tissue Mobilization, Pain Management, Home Exercise Program, Patient Education, Aquatics and Modalities    []  Plan of care initiated. Plan to see patient 2 times per week for 8 weeks to address the treatment planned outlined above.   [x]  Continue with current plan of care  []  Modify plan of care as follows:    []  Hold pending physician visit  []  Discharge    Time In 0707   Time Out 0746   Timed Code Minutes: 39   Total Treatment Time: 39     Electronically Signed by: Corky Currie PTA

## 2022-01-31 ENCOUNTER — HOSPITAL ENCOUNTER (OUTPATIENT)
Dept: PHYSICAL THERAPY | Age: 62
Setting detail: THERAPIES SERIES
Discharge: HOME | End: 2022-01-31
Payer: OTHER GOVERNMENT

## 2022-01-31 PROCEDURE — 97110 THERAPEUTIC EXERCISES: CPT

## 2022-01-31 NOTE — PROGRESS NOTES
[address] [facility]  PHYSICAL THERAPY  [] EVALUATION  [x] DAILY NOTE (LAND) [] DAILY NOTE (AQUATIC) [] PROGRESS NOTE [] DISCHARGE NOTE    [x] OUTPATIENT REHABILITATION CENTER [facility]   [] [place] [address]    [] [facility]   [] Kwabena Carcamo    Date: 2022  Patient Name:  Martine Turner  : 1960  MRN: [id number]  CSN: [id number]    Referring Practitioner Britany Oliver DO   Diagnosis Low back pain, unspecified [M54.50]    Treatment Diagnosis Lumbosacral pain increased with sitting, standing and walking. Impaired functional mobility. Date of Evaluation 12/10/21    Additional Pertinent History Obesity, Afib history, CAD, DM, HTN, chronic low back pain, 3 stents with multiple MIs, emphysema with shortness of breath. Functional Outcome Measure Used Modified Oswestry   Functional Outcome Score 25/50 @ 50%  (12/10/21) evaluation  25/50 @ 50%  ( 22)  Progress note      Insurance: Primary: Payor: Bishop Greco /  /  / ,   Secondary:    Authorization Information: PRE CERTIFICATION REQUIRED: NO  INSURANCE THERAPY BENEFIT:  15 VISITS FROM 12/10/21 THRU 22 UNDER AUTH# [id number], ANYTHING BEYOND 15 VISITS WOULD NEED [facility]AL [facility] FROM [facility]  AQUATIC THERAPY COVERED: YES  MODALITIES COVERED:  YES  TELEHEALTH COVERED:   REFERENCE NUMBER: [id number]   Visit # 8,  for progress note   Visits Allowed: 15 through 0/3/7394   Recertification Date:    Physician Follow-Up: 2022. Physician Orders: PT evaluation and treat approved 15 visits   History of Present Illness: Patient presents with chronic back pain. Notes he moved a refrigerator 20-25 years ago that 'put his back out'. Since then has had history chiropractic adjustments, series of 3x of PT visits. Patient points to bilateral lumbosacral region for area of pain that radiates laterally from spine. Also notes shooting pain down left leg from hip to his knee that is sharp. This shooting pain comes and goes. Notes sitting 30 minutes to 1 hour, standing initially difficult for him, stat. Standing within 10 minutes can bother his back, Difficulty sleeping due to difficulty finding a sleeping position to ease pain, also decreased activity levels with limited lifting, home activities and going out of home less frequently. He manages pain with Flexeril and Hydrocodone. Trazadone. Uses moist heating pad. Also finds that bending knees up with pillows under knees may rest a little bit easier. PT HEP has not been completed for awhile. Did complete them following previous session. Does try and walk for 30 minutes to 1 hour. Notes he had MRI in September or October of spine. SUBJECTIVE:  Patient states he thinks he is getting better but not sure. Reports he is doing a lot more walking over the past 2 weeks and hip gets sore but it does go away. Objective:    TREATMENT   Precautions: Flexion responder, extension increases pain. 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 Pain: 3/10 Left SI joint area and lower back    X in shaded column indicates activity completed today   Modalities Parameters/  Location  Notes   HP on low back while completing mat exercises 23 minutes                 Manual Therapy Time/Technique  Notes   STM to lower lumbar paraspinals and L SI joint area 10 minutes  Seated in chair leaning over pillows               Exercise/Intervention   Notes   NuStep (seat 10/arms 11)  Level 5 6 minutes X           Posterior pelvic tilt  15x 5 sec X Over activation of glutes, cues to engage abdominals    Core control with abdominal engagement (ab brace) 10x 5 sec     PPT with ball squeeze 15x 5 sec  X    PPT with Resisted hook-lying clam (Green) 15x    5 sec     PPT with marching 15x  X    Bridge 5x  X Limited range and limited reps to avoid pain.                  SKTC 5x 20 sec B  X    DKTC 2x 30 sec   X    Piriformis (knee to opposite shoulder) 3x 30 sec B  X    Supine hamstring stretch  3x 20 sec B   Use of towel behind knee to assist          Standing:  Heel raises, marching, HS curls 15x  X Cues to maintain abdominal bracing     Mini squats  15x  X    Standing hip flexion, hip abduction, hip extension 15x  X    Standing Hamstring stretch at steps 3x 20 sec  X                         Reassessment 17 min   Refer to goals summary for status. Specific Interventions Next Treatment: US, soft tissue mobilization, therap ex for core control, flexibility in flexion program, keep in mind hip OA as well left hip>right hip. Position of comfort in supine hooklying. Does not tolerate 90/90 position or pronelying. Activity/Treatment Tolerance:   [x]  Patient tolerated treatment well  []  Patient limited by fatigue  []  Patient limited by pain   []  Patient limited by medical complications  []  Other:     Assessment:  Patient continues to require cues to maintain neutral lumbar spine and abdominal brace. Increased repetitions as able. Patient tolerated activities well and denies having any increased pain. Goals    Patient Goal: To have less back pain with improved tolerance of sitting, standing and walking, Improved activity level. Short Term Goals: 4 weeks  1. Patient to report of decreased pain levels with standing and walking from 3 up to 8-9 with standing and walking to 2-5/10.    2. Patient to demonstrate improvement with lumbar curve reversal with flexion with ease of low back pain and tightness with fingertip from floor to ankle or 2-4 inches fingertips from floor. 3. Patient to demonstrate increased core control with improve lumbar neutral awareness and increased core strength with improved stabilization with therap ex and progressions noted. .    4. Patient to demonstrate increased hip ROM with SLR from 60-70 degrees to 80 degrees, Hip flexion left from 95 degrees to 110 degrees, right hip from 110 degrees to 115 degrees.        5. DEmonstrate knowledge of proper

## 2022-02-02 ENCOUNTER — HOSPITAL ENCOUNTER (OUTPATIENT)
Dept: PHYSICAL THERAPY | Age: 62
Setting detail: THERAPIES SERIES
Discharge: HOME | End: 2022-02-02
Payer: OTHER GOVERNMENT

## 2022-02-02 PROCEDURE — 97110 THERAPEUTIC EXERCISES: CPT

## 2022-02-02 NOTE — PROGRESS NOTES
[address] [facility]  PHYSICAL THERAPY  [] EVALUATION  [x] DAILY NOTE (LAND) [] DAILY NOTE (AQUATIC) [] PROGRESS NOTE [] DISCHARGE NOTE    [x] OUTPATIENT REHABILITATION CENTER [facility]   [] [place] [address]    [] [facility]   [] [facility]    Date: 2022  Patient Name:  Bobbi Stallworth  : 1960  MRN: [id number]  CSN: [id number]    Referring Practitioner Lord Randy DO   Diagnosis Low back pain, unspecified [M54.50]    Treatment Diagnosis Lumbosacral pain increased with sitting, standing and walking. Impaired functional mobility. Date of Evaluation 12/10/21    Additional Pertinent History Obesity, Afib history, CAD, DM, HTN, chronic low back pain, 3 stents with multiple MIs, emphysema with shortness of breath. Functional Outcome Measure Used Modified Oswestry   Functional Outcome Score 25/50 @ 50%  (12/10/21) evaluation  25/50 @ 50%  ( 22)  Progress note      Insurance: Primary: Payor: Arun Garcia /  /  / ,   Secondary:    Authorization Information: PRE CERTIFICATION REQUIRED: NO  INSURANCE THERAPY BENEFIT:  15 VISITS FROM 12/10/21 THRU 22 UNDER AUTH# [id number], ANYTHING BEYOND 15 VISITS WOULD NEED [facility]AL AUTH FROM [facility]  AQUATIC THERAPY COVERED: YES  MODALITIES COVERED:  YES  TELEHEALTH COVERED:   REFERENCE NUMBER: [id number]   Visit # 6, 3/6 for progress note   Visits Allowed: 15 through 1651   Recertification Date: 766   Physician Follow-Up: 2022. Physician Orders: PT evaluation and treat approved 15 visits   History of Present Illness: Patient presents with chronic back pain. Notes he moved a refrigerator 20-25 years ago that 'put his back out'. Since then has had history chiropractic adjustments, series of 3x of PT visits. Patient points to bilateral lumbosacral region for area of pain that radiates laterally from spine. Also notes shooting pain down left leg from hip to his knee that is sharp. B  x Use of towel behind knee to assist          Quadruped: UE reciprocal 10x  x    Quadruped  LE reciprocal        cristiane pose  3x  x           Wall squats-back against wall  10x 5 count x           Standing:  Heel raises, marching,  15x  x Cues to maintain abdominal bracing     Mini squats  15x      Standing hip flexion, hip abduction, hip extension 10x  x    Standing Hamstring stretch at steps 3x 20 sec  x                         Reassessment 17 min   Refer to goals summary for status. Specific Interventions Next Treatment: US, soft tissue mobilization, therap ex for core control, flexibility in flexion program, keep in mind hip OA as well left hip>right hip. Position of comfort in supine hooklying. Does not tolerate 90/90 position or pronelying. Activity/Treatment Tolerance:   [x]  Patient tolerated treatment well  []  Patient limited by fatigue  []  Patient limited by pain   []  Patient limited by medical complications  []  Other:     Assessment:  Patient a little more sore today with pain level 5/10 so started with heat and supinelying exercises for flexibility and core. Continued with same reps with ex as previous session. Progressed to quadruped ex, child's pose, and wall slides with partial squat. Worked on core engagement and technique with standing ex for core control, strength and stabilization with square pelvis. Note good technique with standing hamstring stretch and improving technique with supine/hooklying ex. Pain level at end of session: remained 5/10. Noting improved flexibility. Goals    Patient Goal: To have less back pain with improved tolerance of sitting, standing and walking, Improved activity level. Short Term Goals: 4 weeks  1.  Patient to report of decreased pain levels with standing and walking from 3 up to 8-9 with standing and walking to 2-5/10.    2. Patient to demonstrate improvement with lumbar curve reversal with flexion with ease of low back pain and tightness with fingertip from floor to ankle or 2-4 inches fingertips from floor. 3. Patient to demonstrate increased core control with improve lumbar neutral awareness and increased core strength with improved stabilization with therap ex and progressions noted. .    4. Patient to demonstrate increased hip ROM with SLR from 60-70 degrees to 80 degrees, Hip flexion left from 95 degrees to 110 degrees, right hip from 110 degrees to 115 degrees. 5. DEmonstrate knowledge of proper body mechanics to decrease stress and strain on his back. Long Term Goals: 8 weeks  1. Modified Oswestry from 25/50 @ 50% to 25%    2. Patient independent in HEP with follow through with exercises for pain management. ROM, core strength, lumbar awareness, and progression in exercises. Patient Education:   []  HEP/Education Completed: Importance of completing HEP daily, use of heat and ice   Medbridge Access Code:  []  No new Education completed  [x]  Reviewed Prior HEP      [x]  Patient verbalized and/or demonstrated understanding of education provided. []  Patient unable to verbalize and/or demonstrate understanding of education provided. Will continue education. []  Barriers to learning:     PLAN:  Treatment Recommendations: Strengthening, Range of Motion, Balance Training, Functional Mobility Training, Transfer Training, Neuromuscular Re-education, Manual Therapy - Soft Tissue Mobilization, Pain Management, Home Exercise Program, Patient Education, Aquatics and Modalities    []  Plan of care initiated. Plan to see patient 2 times per week for 8 weeks to address the treatment planned outlined above.   [x]  Continue with current plan of care  []  Modify plan of care as follows:    []  Hold pending physician visit  []  Discharge    Time In 0654   Time Out 0730   Timed Code Minutes: 36    Total Treatment Time: 36     Electronically Signed by: Onur Ashford PT

## 2022-02-07 ENCOUNTER — HOSPITAL ENCOUNTER (OUTPATIENT)
Dept: PHYSICAL THERAPY | Age: 62
Setting detail: THERAPIES SERIES
Discharge: HOME | End: 2022-02-07
Payer: OTHER GOVERNMENT

## 2022-02-07 PROCEDURE — 97110 THERAPEUTIC EXERCISES: CPT

## 2022-02-07 NOTE — PROGRESS NOTES
[facility]  PHYSICAL [facility]ERAPY  [] EVALUATION  [x] DAILY NOTE (LAND) [] DAILY NOTE (AQUATIC) [] PROGRESS NOTE [] DISCHARGE NOTE    [x] OUTPATIENT REHABILITATION CENTER [facility]   [] [place] [address]    [] [facility]   [] [facility]    Date: 2022  Patient Name:  Sha Reinoso  : 1960  MRN: [id number]  CSN: [id number]    Referring Practitioner Tavo Kingsley DO   Diagnosis Low back pain, unspecified [M54.50]    Treatment Diagnosis Lumbosacral pain increased with sitting, standing and walking. Impaired functional mobility. Date of Evaluation 12/10/21    Additional Pertinent History Obesity, Afib history, CAD, DM, HTN, chronic low back pain, 3 stents with multiple MIs, emphysema with shortness of breath. Functional Outcome Measure Used Modified Oswestry   Functional Outcome Score 25/50 @ 50%  (12/10/21) evaluation  25/50 @ 50%  ( 22)  Progress note      Insurance: Primary: Payor: Steffen Crain /  /  / ,   Secondary:    Authorization Information: PRE CERTIFICATION REQUIRED: NO  INSURANCE THERAPY BENEFIT:  15 VISITS FROM 12/10/21 THRU 22 UNDER AUTH# [id number], ANYTHING BEYOND 15 VISITS WOULD NEED [facility]DITIONAL AUTH FROM [facility]  AQUATIC THERAPY COVERED: YES  MODALITIES COVERED:  YES  TELEHEALTH COVERED:   REFERENCE NUMBER: [id number]   Visit # 12,  for progress note   Visits Allowed: 15 through 1748   Recertification Date: 8800   Physician Follow-Up: 2022. Physician Orders: PT evaluation and treat approved 15 visits   History of Present Illness: Patient presents with chronic back pain. Notes he moved a refrigerator 20-25 years ago that 'put his back out'. Since then has had history chiropractic adjustments, series of 3x of PT visits. Patient points to bilateral lumbosacral region for area of pain that radiates laterally from spine. Also notes shooting pain down left leg from hip to his knee that is sharp. This shooting pain comes and goes. Notes sitting 30 minutes to 1 hour, standing initially difficult for him, stat. Standing within 10 minutes can bother his back, Difficulty sleeping due to difficulty finding a sleeping position to ease pain, also decreased activity levels with limited lifting, home activities and going out of home less frequently. He manages pain with Flexeril and Hydrocodone. Trazadone. Uses moist heating pad. Also finds that bending knees up with pillows under knees may rest a little bit easier. PT HEP has not been completed for awhile. Did complete them following previous session. Does try and walk for 30 minutes to 1 hour. Notes he had MRI in September or October of spine. SUBJECTIVE:  Patient states his  quit working on him and he had to shovel snow. Reports slightly elevated pain levels today. Patient did some of he exercises at home over the long weekend but limited because of pain. Objective:    TREATMENT   Precautions: Flexion responder, extension increases pain. 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     Pain: 6-7/10 Left SI joint area and lower back    X in shaded column indicates activity completed today   Modalities Parameters/  Location  Notes   HP on low back while completing mat exercises 23 minutes                 Manual Therapy Time/Technique  Notes   STM to lower lumbar paraspinals and L SI joint area 10 minutes  Seated in chair leaning over pillows               Exercise/Intervention   Notes   NuStep (seat 10/arms 11)  Level 5 6 minutes X           Posterior pelvic tilt  15x 5 sec X Over activation of glutes, cues to engage abdominals    Core control with abdominal engagement (ab brace) 10x 5 sec     PPT with ball squeeze 15x 5 sec  X    PPT with Resisted hook-lying clam (Green) 15x    5 sec X    PPT with marching 15x  X                         SKTC 5x 20 sec B  X    DKTC 2x 30 sec   X    Piriformis (knee to opposite shoulder) 3x 30 sec B  X    Supine hamstring stretch  3x 20 sec B   Use of towel behind knee to assist          Quadruped: UE reciprocal 10x  X    Quadruped  LE reciprocal        Alla pose  3x  X           Wall squats-back against wall  10x 5 sec X           Standing:  Heel raises, marching 15x  X Cues to maintain abdominal bracing     Mini squats  15x      Standing hip flexion, hip abduction, hip extension 10x  X    Standing Hamstring stretch at steps 3x 20 sec  X                         Reassessment 17 min   Refer to goals summary for status. Specific Interventions Next Treatment: US, soft tissue mobilization, therap ex for core control, flexibility in flexion program, keep in mind hip OA as well left hip>right hip. Position of comfort in supine hooklying. Does not tolerate 90/90 position or pronelying. Activity/Treatment Tolerance:   [x]  Patient tolerated treatment well  []  Patient limited by fatigue  []  Patient limited by pain   []  Patient limited by medical complications  []  Other:     Assessment:  Did not progress exercises or repetitions today due to patient reporting elevated pain levels. Patient able to complete exercises as listed above with pain levels remaining at 6-7/10 in Right lower back. Patient demonstrating improved technique with activities, and core control. Patient declined use of heat during supine exercises. Improved flexibility noted at end of session. Goals    Patient Goal: To have less back pain with improved tolerance of sitting, standing and walking, Improved activity level. Short Term Goals: 4 weeks  1. Patient to report of decreased pain levels with standing and walking from 3 up to 8-9 with standing and walking to 2-5/10.    2. Patient to demonstrate improvement with lumbar curve reversal with flexion with ease of low back pain and tightness with fingertip from floor to ankle or 2-4 inches fingertips from floor.        3. Patient to demonstrate increased core control with improve lumbar neutral awareness and increased core strength with improved stabilization with therap ex and progressions noted. .    4. Patient to demonstrate increased hip ROM with SLR from 60-70 degrees to 80 degrees, Hip flexion left from 95 degrees to 110 degrees, right hip from 110 degrees to 115 degrees. 5. Demonstrate knowledge of proper body mechanics to decrease stress and strain on his back. Long Term Goals: 8 weeks  1. Modified Oswestry from 25/50 @ 50% to 25%    2. Patient independent in HEP with follow through with exercises for pain management. ROM, core strength, lumbar awareness, and progression in exercises. Patient Education:   []  HEP/Education Completed: Importance of completing HEP daily, use of heat and ice   Medbridge Access Code:  []  No new Education completed  [x]  Reviewed Prior HEP      [x]  Patient verbalized and/or demonstrated understanding of education provided. []  Patient unable to verbalize and/or demonstrate understanding of education provided. Will continue education. []  Barriers to learning:     PLAN:  Treatment Recommendations: Strengthening, Range of Motion, Balance Training, Functional Mobility Training, Transfer Training, Neuromuscular Re-education, Manual Therapy - Soft Tissue Mobilization, Pain Management, Home Exercise Program, Patient Education, Aquatics and Modalities    []  Plan of care initiated. Plan to see patient 2 times per week for 8 weeks to address the treatment planned outlined above.   [x]  Continue with current plan of care  []  Modify plan of care as follows:    []  Hold pending physician visit  []  Discharge    Time In 0915   Time Out 1000   Timed Code Minutes:  45 min   Total Treatment Time: 45 min     Electronically Signed by: Demi Goodson PTA

## 2022-02-09 ENCOUNTER — HOSPITAL ENCOUNTER (OUTPATIENT)
Dept: PHYSICAL THERAPY | Age: 62
Setting detail: THERAPIES SERIES
Discharge: HOME | End: 2022-02-09
Payer: OTHER GOVERNMENT

## 2022-02-09 PROCEDURE — 97110 THERAPEUTIC EXERCISES: CPT

## 2022-02-09 PROCEDURE — 97164 PT RE-EVAL EST PLAN CARE: CPT

## 2022-02-09 NOTE — DISCHARGE SUMMARY
[address] [facility]  PHYSICAL THERAPY  [] EVALUATION  [] DAILY NOTE (LAND) [] DAILY NOTE (AQUATIC) [] PROGRESS NOTE [x] DISCHARGE NOTE    [x] OUTPATIENT REHABILITATION CENTER [facility]   [] [place] [address]    [] [facility]   [] Annabelle Caldera    Date: 2022  Patient Name:  Abilio Huber  : 1960  MRN: [id number]  CSN: [id number]    Referring Practitioner Herman Acevedo DO   Diagnosis Low back pain, unspecified [M54.50]    Treatment Diagnosis Lumbosacral pain increased with sitting, standing and walking. Impaired functional mobility. Date of Evaluation 12/10/21    Additional Pertinent History Obesity, Afib history, CAD, DM, HTN, chronic low back pain, 3 stents with multiple MIs, emphysema with shortness of breath. Functional Outcome Measure Used Modified Oswestry   Functional Outcome Score 25/50 @ 50%  (12/10/21) evaluation  25/50 @ 50%  ( 22)  Progress note  23/50 @ 46%  (22) Discharge      Insurance: Primary: Payor: Milton Abernathy /  /  / ,   Secondary:    Authorization Information: PRE CERTIFICATION REQUIRED: NO  INSURANCE THERAPY BENEFIT:  15 VISITS FROM 12/10/21 THRU 22 UNDER AUTH# [id number], ANYTHING BEYOND 15 VISITS WOULD NEED ADDITIONAL AUTH FROM [facility]  AQUATIC THERAPY COVERED: YES  MODALITIES COVERED:  YES  TELEHEALTH COVERED:   REFERENCE NUMBER: [id number]   Visit # 13,Discharge   Visits Allowed: 15 through 3629   Recertification Date: 1090   Physician Follow-Up: 2022. Physician Orders: PT evaluation and treat approved 15 visits   History of Present Illness: Patient presents with chronic back pain. Notes he moved a refrigerator 20-25 years ago that 'put his back out'. Since then has had history chiropractic adjustments, series of 3x of PT visits. Patient points to bilateral lumbosacral region for area of pain that radiates laterally from spine.  Also notes shooting pain down left leg from hip to his knee that is sharp. This shooting pain comes and goes. Notes sitting 30 minutes to 1 hour, standing initially difficult for him, stat. Standing within 10 minutes can bother his back, Difficulty sleeping due to difficulty finding a sleeping position to ease pain, also decreased activity levels with limited lifting, home activities and going out of home less frequently. He manages pain with Flexeril and Hydrocodone. Trazadone. Uses moist heating pad. Also finds that bending knees up with pillows under knees may rest a little bit easier. PT HEP has not been completed for awhile. Did complete them following previous session. Does try and walk for 30 minutes to 1 hour. Notes he had MRI in September or October of spine. SUBJECTIVE:  Patient reports of less back pain with pain level 3/10 bilateral low back. Shooting pain remains intermittent down left leg from hip to knee. Patient does understand his HEP to continue with. Patient states he is getting more active since starting PT. Doing things more often. Used to start hurting within 20 minutes doing cooking and laundry. Now able to complete activities without increased pain. Also noting increased stamina. Pain levels remain varied and about the same as when he started. Dr. Mccracken  him the next plan with be referral to pain management. Objective:    TREATMENT   Precautions: Flexion responder, extension increases pain. 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     Pain: 6-7/10 Left SI joint area and lower back    X in shaded column indicates activity completed today   Modalities Parameters/  Location  Notes   HP on low back while completing mat exercises 23 minutes                 Manual Therapy Time/Technique  Notes   STM to lower lumbar paraspinals and L SI joint area 10 minutes  Seated in chair leaning over pillows               Exercise/Intervention   Notes   NuStep (seat 10/arms 11)  Level 5 6 minutes            Posterior pelvic tilt  15x 5 sec HEP Over activation of glutes, cues to engage abdominals    Core control with abdominal engagement (ab brace) 10x 5 sec HEP    PPT with ball squeeze 15x 5 sec  HEP    PPT with Resisted hook-lying clam (Green) 15x    5 sec HEP    PPT with marching 15x                           SKTC 5x 20 sec B  X    DKTC 2x 30 sec   X    Piriformis (knee to opposite shoulder) 3x 30 sec B  X    Supine hamstring stretch  3x 20 sec B  x Use of towel behind knee to assist          Quadruped: UE reciprocal 10x  HEP    Quadruped  LE reciprocal        Alla pose  3x             Wall squats-back against wall  10x 5 sec HEP           Standing:  Heel raises, marching 15x  HEP Cues to maintain abdominal bracing     Mini squats  15x      Standing hip flexion, hip abduction, hip extension 10x  HEP    Standing Hamstring stretch at steps 3x 20 sec  HEP                         Reassessment 17 min  x Refer to goals summary for status. Specific Interventions Next Treatment: US, soft tissue mobilization, therap ex for core control, flexibility in flexion program, keep in mind hip OA as well left hip>right hip. Position of comfort in supine hooklying. Does not tolerate 90/90 position or pronelying. Activity/Treatment Tolerance:   [x]  Patient tolerated treatment well  []  Patient limited by fatigue  []  Patient limited by pain   []  Patient limited by medical complications  []  Other:     Assessment:  Reassessment for discharge from PT. Patient demonstrating slight decrease with improvement with Oswestry score, He is also demonstrating improved lumbar curve reversal and lumbar flexibility 4 inches fingertips from floor. Pain levels today are 3/10. But at times he reports pain levels can still increase to 8/10 with too much walking and standing. He has been able to increase walking up to 1 hour now. STanding to 30 minutes.  Patient has copy of HEP to follow to continue with flexibility, core strengthening, back stabilization ex and dynamic standing ex with core engagment. Will discharge at this time. Goals    Patient Goal: To have less back pain with improved tolerance of sitting, standing and walking, Improved activity level. GOAL MET Noted improved activity level with ability to sit longer depending on chair, Stand and walk now from 30 minutes to 1 hour. Can stand up to 30 minutes. Short Term Goals: 4 weeks  1. Patient to report of decreased pain levels with standing and walking from 3 up to 8-9 with standing and walking to 2-5/10. GOAL NOT MET Pain levels continue to be at his back and some days does not effect his back and other days can increase 3 to 8-9/10.     2. Patient to demonstrate improvement with lumbar curve reversal with flexion with ease of low back pain and tightness with fingertip from floor to ankle or 2-4 inches fingertips from floor. GOAL MET noting improved curve reversal at lumbar spine: fingertips from floor 4 inches. 3. Patient to demonstrate increased core control with improve lumbar neutral awareness and increased core strength with improved stabilization with therap ex and progressions noted. GOAL MET Patient more aware of lumbar position. More habit now with core engagement and watching alignment. Understand therap ex and have been able to progress in these ex. .    4. Patient to demonstrate increased hip ROM with SLR from 60-70 degrees to 80 degrees, Hip flexion left from 95 degrees to 110 degrees, right hip from 110 degrees to 115 degrees. GOAL MET with SLR increased hamstring flexibility to 80 degrees right, 80 degrees left, Left hip flexion to 112 degrees, right hip flexion 115.       5. Demonstrate knowledge of proper body mechanics to decrease stress and strain on his back. GOAL MET independent and knowledge body mechanics. Long Term Goals: 8 weeks  1. Modified Oswestry from 25/50 @ 50% to 25%  GOAL NOT MET Oswestry to 46%.       2. Patient independent in HEP with follow through with exercises for pain management. ROM, core strength, lumbar awareness, and progression in exercises. GOAL MET independent with HEP. Patient Education:   []  HEP/Education Completed: Importance of completing HEP daily, use of heat and ice   Medbridge Access Code:  []  No new Education completed  [x]  Reviewed Prior HEP      [x]  Patient verbalized and/or demonstrated understanding of education provided. []  Patient unable to verbalize and/or demonstrate understanding of education provided. Will continue education. []  Barriers to learning:     PLAN:  Treatment Recommendations: Strengthening, Range of Motion, Balance Training, Functional Mobility Training, Transfer Training, Neuromuscular Re-education, Manual Therapy - Soft Tissue Mobilization, Pain Management, Home Exercise Program, Patient Education, Aquatics and Modalities    []  Plan of care initiated. Plan to see patient 2 times per week for 8 weeks to address the treatment planned outlined above.   []  Continue with current plan of care  []  Modify plan of care as follows:    []  Hold pending physician visit  [x]  Discharge    Time In 0702   Time Out 0743   Timed Code Minutes: 24   Total Treatment Time: 41     Electronically Signed by: Carina Roblero PT

## 2023-01-27 RX ORDER — NITROGLYCERIN 0.4 MG/1
0.4 TABLET SUBLINGUAL EVERY 5 MIN PRN
COMMUNITY

## 2023-01-27 RX ORDER — METOPROLOL TARTRATE 50 MG/1
50 TABLET, FILM COATED ORAL 2 TIMES DAILY
COMMUNITY

## 2023-01-27 RX ORDER — ISOSORBIDE MONONITRATE 60 MG/1
60 TABLET, EXTENDED RELEASE ORAL DAILY
COMMUNITY

## 2023-01-27 RX ORDER — OMEPRAZOLE 20 MG/1
20 CAPSULE, DELAYED RELEASE ORAL DAILY
COMMUNITY

## 2023-01-27 RX ORDER — CLOPIDOGREL BISULFATE 75 MG/1
75 TABLET ORAL DAILY
COMMUNITY

## 2023-01-27 RX ORDER — GLIPIZIDE 5 MG/1
5 TABLET ORAL
COMMUNITY

## 2023-01-27 RX ORDER — UMECLIDINIUM BROMIDE AND VILANTEROL TRIFENATATE 62.5; 25 UG/1; UG/1
POWDER RESPIRATORY (INHALATION)
COMMUNITY

## 2023-01-27 RX ORDER — DILTIAZEM HYDROCHLORIDE 240 MG/1
240 CAPSULE, EXTENDED RELEASE ORAL DAILY
COMMUNITY

## 2023-01-27 RX ORDER — TAMSULOSIN HYDROCHLORIDE 0.4 MG/1
0.4 CAPSULE ORAL DAILY
COMMUNITY

## 2023-01-27 RX ORDER — ATORVASTATIN CALCIUM 80 MG/1
80 TABLET, FILM COATED ORAL DAILY
COMMUNITY

## 2023-01-27 RX ORDER — EZETIMIBE 10 MG/1
10 TABLET ORAL DAILY
COMMUNITY

## 2023-01-27 RX ORDER — ALBUTEROL SULFATE 90 UG/1
2 AEROSOL, METERED RESPIRATORY (INHALATION) EVERY 6 HOURS PRN
COMMUNITY

## 2023-01-27 RX ORDER — HYDROCODONE BITARTRATE AND ACETAMINOPHEN 5; 325 MG/1; MG/1
1 TABLET ORAL 2 TIMES DAILY
COMMUNITY

## 2023-01-29 NOTE — H&P
[address] [place] [address] Endoscopy    Pre-Endoscopy H&PE      Patient: Satnam Rolle    : 1960    Acct#: [de-identified]  Primary Care Physician: Jaymie Hendrix DO     Planned Procedure:    []EGD    []Enteroscopy    []PEG    []PEG change    []PEG removal  []Variceal banding    []Biopsy   []Dilation      []Control of bleeding  []Destruction of lesion by [facility]    []Stent Placement  []Foreign Body Removal    []Snare Polypectomy  []Other:       [x]Colonoscopy  []Flex Sigmoid []EUS, rectal      []Biopsy   []Dilation      []Control of bleeding  []Destruction of lesion by [facility]    []Stent Placement  []Foreign Body Removal    []Snare Polypectomy  []Other:      Planned Sedation  []Conscious Sedation [x]MAC/Propofol []Anesthesia    []None    Airway:  Adequate for planned sedation    Indication:   Screening    History:  The patient is a 63 y.o. male without a fhx of CRC presenting for a screening colonoscopy. He has COPD, CAD, Afib, HTN, DM. He takes Plavix for CAD with PCI in 2019 which he has held for 5 days. He takes Eliquis for Afib which he has held for 2 days. Physical Exam:  VITALS: /68   Pulse 62   Temp 96.8 °F (36 °C) (Temporal)   Resp 18   Ht 5' 9\" (1.753 m)   Wt 221 lb 9.6 oz (100.5 kg)   SpO2 93%   BMI 32.72 kg/m²   The patient is a 63 y.o. male in no acute distress. HEAD: Normal cephalic/atraumatic. Extra-occular motions intact bilaterally. NECK: No lymphadenopathy or bruits. CHEST: Rises equally on inspiration. Clear to auscultation bilaterally. CARDIOVASCULAR: Regular rate and rhythm without murmurs, rubs or gallops. ABDOMEN: Soft, nontender, and nondistended with normal bowel sounds. No Hepatosplemomegaly. UPPER EXTREMITIES: no cyanosis, clubbing, or edema. DERM: no rash or jaundice. LOWER EXTREMITIES: no cyanosis, clubbing, or edema. NEURO: Alert and oriented times four. Patient moves all extremities and has gross sensation in all extremities.     ASA: ASA 3 - Patient with moderate systemic disease with functional limitations    Past Medical History:        Diagnosis Date    Arthritis     CAD (coronary artery disease)     Cancer ([facility] [address]     COPD (chronic obstructive [facility] disease) [facility]     Diabetes mellitus ([facility]     Hyperlipidemia     Hypertension      Past Surgical History:        Procedure Laterality Date    CARDIAC SURGERY      CORONARY STENT PLACEMENT      FRACTURE SURGERY       Allergies:  Patient has no known allergies. Home Meds:  Prior to Admission medications    Medication Sig Start Date End Date Taking? Authorizing Provider   HYDROcodone-acetaminophen (NORCO) 5-325 MG per tablet Take 1 tablet by mouth 2 times daily. Yes Historical Provider, MD   metoprolol tartrate (LOPRESSOR) 50 MG tablet Take 50 mg by mouth 2 times daily   Yes Historical Provider, MD   glipiZIDE (GLUCOTROL) 5 MG tablet Take 5 mg by mouth 2 times daily (before meals)   Yes Historical Provider, MD   tamsulosin (FLOMAX) 0.4 MG capsule Take 0.4 mg by mouth daily   Yes Historical Provider, MD   atorvastatin (LIPITOR) 80 MG tablet Take 80 mg by mouth daily   Yes Historical Provider, MD   nitroGLYCERIN (NITROSTAT) 0.4 MG SL tablet Place 0.4 mg under the tongue every 5 minutes as needed for Chest pain up to max of 3 total doses. If no relief after 1 dose, call 911.    Yes Historical Provider, MD   albuterol sulfate HFA (PROVENTIL;VENTOLIN;PROAIR) 108 (90 Base) MCG/ACT inhaler Inhale 2 puffs into the lungs every 6 hours as needed for Wheezing   Yes Historical Provider, MD   isosorbide mononitrate (IMDUR) 60 MG extended release tablet Take 60 mg by mouth daily   Yes Historical Provider, MD   dilTIAZem (DILACOR XR) 240 MG extended release capsule Take 240 mg by mouth daily   Yes Historical Provider, MD   Umeclidinium-Vilanterol (ANORO ELLIPTA) 62.5-25 MCG/ACT AEPB Inhale into the lungs   Yes Historical Provider, MD   clopidogrel (PLAVIX) 75 MG tablet Take 75 mg by mouth daily   Yes Historical Provider, MD ezetimibe (ZETIA) 10 MG tablet Take 10 mg by mouth daily   Yes Historical Provider, MD   metFORMIN (GLUCOPHAGE) 1000 MG tablet Take 500 mg by mouth 2 times daily (with meals)   Yes Historical Provider, MD   apixaban (ELIQUIS) 5 MG TABS tablet Take by mouth 2 times daily   Yes Historical Provider, MD   omeprazole (PRILOSEC) 20 MG delayed release capsule Take 20 mg by mouth daily   Yes Historical Provider, MD     Social History:   Social History     Socioeconomic History    Marital status:      Spouse name: Not on file    Number of children: Not on file    Years of education: Not on file    Highest education level: Not on file   Occupational History    Not on file   Tobacco Use    Smoking status: Every Day     Packs/day: 0.50     Years: 45.00     Pack years: 22.50     Types: Cigarettes    Smokeless tobacco: Never   Vaping Use    Vaping Use: Never used   Substance and Sexual Activity    Alcohol use: Yes     Comment: occasionnally    Drug use: Never    Sexual activity: Not on file   Other Topics Concern    Not on file   Social History Narrative    Not on file     Social Determinants of Health     Financial Resource Strain: Not on file   Food Insecurity: Not on file   Transportation Needs: Not on file   Physical Activity: Not on file   Stress: Not on file   Social Connections: Not on file   Intimate Partner Violence: Not on file   Housing Stability: Not on file     Family History:   No GI malignancies     ROS:  GENERAL: No fever or chills. NEURO: No headache or seizure. EYES: No diplopia or vision loss. CARDIOVASCULAR: No chest pain or palpitations. RESPIRATORY: No dyspnea or cough. GI: NO melena. NO hematochezia   : No dysuria or hematuria. GYN: Not appropriate. MUSCULOSKELETAL: No new arthralgias or myalgias  DERM: No rash or jaundice. ENDOCRINE: No polyuria or polydipsia. PSYCH: No anxiety or depression.     Informed Consent:  The risks and benefits of the procedure have been discussed with either the patient or if they cannot consent, their representative. Assessment:  Patient examined and appropriate for planned sedation and procedure. Plan:  Proceed with planned sedation and procedure.     Letitia Amin MD  8:02 AM 1/30/2023

## 2023-01-30 ENCOUNTER — ANESTHESIA EVENT (OUTPATIENT)
Dept: ENDOSCOPY | Age: 63
End: 2023-01-30
Payer: OTHER GOVERNMENT

## 2023-01-30 ENCOUNTER — ANESTHESIA (OUTPATIENT)
Dept: ENDOSCOPY | Age: 63
End: 2023-01-30
Payer: OTHER GOVERNMENT

## 2023-01-30 ENCOUNTER — HOSPITAL ENCOUNTER (OUTPATIENT)
Age: 63
Setting detail: OUTPATIENT SURGERY
Discharge: HOME | End: 2023-01-30
Attending: INTERNAL MEDICINE | Admitting: INTERNAL MEDICINE
Payer: OTHER GOVERNMENT

## 2023-01-30 VITALS
WEIGHT: 221.6 LBS | OXYGEN SATURATION: 94 % | HEIGHT: 69 IN | TEMPERATURE: 97.3 F | HEART RATE: 64 BPM | DIASTOLIC BLOOD PRESSURE: 56 MMHG | SYSTOLIC BLOOD PRESSURE: 106 MMHG | RESPIRATION RATE: 18 BRPM | BODY MASS INDEX: 32.82 KG/M2

## 2023-01-30 PROCEDURE — 3700000000 HC ANESTHESIA ATTENDED CARE: Performed by: INTERNAL MEDICINE

## 2023-01-30 PROCEDURE — 6360000002 HC RX W HCPCS: Performed by: NURSE ANESTHETIST, CERTIFIED REGISTERED

## 2023-01-30 PROCEDURE — 3609010600 HC COLONOSCOPY POLYPECTOMY SNARE/COLD BIOPSY: Performed by: INTERNAL MEDICINE

## 2023-01-30 PROCEDURE — 2580000003 HC RX 258: Performed by: INTERNAL MEDICINE

## 2023-01-30 PROCEDURE — 3700000001 HC ADD 15 MINUTES (ANESTHESIA): Performed by: INTERNAL MEDICINE

## 2023-01-30 PROCEDURE — 7100000010 HC PHASE II RECOVERY - FIRST 15 MIN: Performed by: INTERNAL MEDICINE

## 2023-01-30 PROCEDURE — 7100000011 HC PHASE II RECOVERY - ADDTL 15 MIN: Performed by: INTERNAL MEDICINE

## 2023-01-30 PROCEDURE — 2580000003 HC RX 258: Performed by: NURSE ANESTHETIST, CERTIFIED REGISTERED

## 2023-01-30 PROCEDURE — 2709999900 HC NON-CHARGEABLE SUPPLY: Performed by: INTERNAL MEDICINE

## 2023-01-30 PROCEDURE — 2720000010 HC SURG SUPPLY STERILE: Performed by: INTERNAL MEDICINE

## 2023-01-30 PROCEDURE — 88305 TISSUE EXAM BY PATHOLOGIST: CPT

## 2023-01-30 RX ORDER — SODIUM CHLORIDE 9 MG/ML
INJECTION, SOLUTION INTRAVENOUS CONTINUOUS
Status: DISCONTINUED | OUTPATIENT
Start: 2023-01-30 | End: 2023-01-30 | Stop reason: HOSPADM

## 2023-01-30 RX ORDER — SODIUM CHLORIDE 9 MG/ML
INJECTION, SOLUTION INTRAVENOUS CONTINUOUS PRN
Status: DISCONTINUED | OUTPATIENT
Start: 2023-01-30 | End: 2023-01-30 | Stop reason: SDUPTHER

## 2023-01-30 RX ADMIN — PROPOFOL 30 MG: 10 INJECTION, EMULSION INTRAVENOUS at 08:22

## 2023-01-30 RX ADMIN — PROPOFOL 20 MG: 10 INJECTION, EMULSION INTRAVENOUS at 08:28

## 2023-01-30 RX ADMIN — PROPOFOL 30 MG: 10 INJECTION, EMULSION INTRAVENOUS at 08:07

## 2023-01-30 RX ADMIN — SODIUM CHLORIDE: 9 INJECTION, SOLUTION INTRAVENOUS at 08:02

## 2023-01-30 RX ADMIN — PROPOFOL 20 MG: 10 INJECTION, EMULSION INTRAVENOUS at 08:13

## 2023-01-30 RX ADMIN — PROPOFOL 20 MG: 10 INJECTION, EMULSION INTRAVENOUS at 08:31

## 2023-01-30 RX ADMIN — PROPOFOL 20 MG: 10 INJECTION, EMULSION INTRAVENOUS at 08:10

## 2023-01-30 RX ADMIN — PROPOFOL 40 MG: 10 INJECTION, EMULSION INTRAVENOUS at 08:02

## 2023-01-30 RX ADMIN — PROPOFOL 30 MG: 10 INJECTION, EMULSION INTRAVENOUS at 08:17

## 2023-01-30 RX ADMIN — PROPOFOL 20 MG: 10 INJECTION, EMULSION INTRAVENOUS at 08:20

## 2023-01-30 RX ADMIN — SODIUM CHLORIDE: 9 INJECTION, SOLUTION INTRAVENOUS at 07:46

## 2023-01-30 RX ADMIN — PROPOFOL 20 MG: 10 INJECTION, EMULSION INTRAVENOUS at 08:25

## 2023-01-30 RX ADMIN — PROPOFOL 20 MG: 10 INJECTION, EMULSION INTRAVENOUS at 08:08

## 2023-01-30 ASSESSMENT — PAIN - FUNCTIONAL ASSESSMENT: PAIN_FUNCTIONAL_ASSESSMENT: 0-10

## 2023-01-30 ASSESSMENT — LIFESTYLE VARIABLES: SMOKING_STATUS: 1

## 2023-01-30 ASSESSMENT — COPD QUESTIONNAIRES: CAT_SEVERITY: MODERATE

## 2023-01-30 ASSESSMENT — PAIN SCALES - GENERAL
PAINLEVEL_OUTOF10: 0
PAINLEVEL_OUTOF10: 0

## 2023-01-30 NOTE — PROGRESS NOTES
Recovery mode, denies discomfort. Passing gas, taking fluids. Dr. Ambrosio Graves discussed findings with pt and . Discharge instructions provided and understanding verbalized.

## 2023-01-30 NOTE — PROGRESS NOTES
Colonoscopy completed, Pictures taken. Polyps removed by cold snare. 4 jars labeled and sent to lab. Pt tolerated well. PACS down, pictures not printed.      Scope  Used

## 2023-01-30 NOTE — ANESTHESIA POSTPROCEDURE EVALUATION
Department of Anesthesiology  Postprocedure Note    Patient: Leah Archibald  MRN: [id number]  YOB: 1960  Date of evaluation: 1/30/2023      Procedure Summary     Date: 01/30/23 Room / Location: [address] [place] [address]    Anesthesia Start: Guerry Pour Anesthesia Stop: 4038    Procedure: COLONOSCOPY POLYPECTOMY SNARE/COLD BIOPSY Diagnosis:       Encounter for screening colonoscopy      (Encounter for screening colonoscopy [Z12.11])    Surgeons: Bebo Hung MD Responsible Provider: Ratna Leong MD    Anesthesia Type: MAC ASA Status: 3          Anesthesia Type: No value filed.     Altaf Phase I: Altaf Score: 10    Altaf Phase II:        Anesthesia Post Evaluation    Patient location during evaluation: bedside  Patient participation: complete - patient cannot participate  Level of consciousness: awake and alert  Pain score: 0  Airway patency: patent  Nausea & Vomiting: no vomiting and no nausea  Complications: no  Cardiovascular status: blood pressure returned to baseline and hemodynamically stable  Respiratory status: acceptable, nasal cannula, nonlabored ventilation and spontaneous ventilation  Hydration status: stable

## 2023-01-30 NOTE — OP NOTE
[facility] Endoscopy    Patient: Kimberley Paez  : 1960  Acct#: [de-identified]  Date of evaluation: 2023  Primary Care Physician: No primary care provider on file. Procedure:    []EGD    []Enteroscopy    []Biopsy   []Dilation      []PEG    []PEG change    []PEG removal  []Variceal banding    []Control of bleeding  []Destruction of lesion by [facility]    []Stent Placement  []Foreign Body Removal    []Snare Polypectomy  []Other:     []Aborted:        [x]Colonoscopy  []Flex Sigmoid []EUS, rectal     []Biopsy   [x]Snare Polypectomy    []Control of bleeding  []Destruction of lesion by [facility]    []Stent Placement  []Foreign Body Removal    []Dilation    []Other:    []Aborted:   []Tattoo    Indication:   Screening    History:  The patient is a 63 y.o. male without a fhx of CRC presenting for a screening colonoscopy. He has COPD, CAD, Afib, HTN, DM. He takes Plavix for CAD with PCI in 2019 which he has held for 5 days. He takes Eliquis for Afib which he has held for 2 days. Physical Exam:  VITALS: /68   Pulse 62   Temp 96.8 °F (36 °C) (Temporal)   Resp 18   Ht 5' 9\" (1.753 m)   Wt 221 lb 9.6 oz (100.5 kg)   SpO2 93%   BMI 32.72 kg/m²   The patient is a 63 y.o. male in no acute distress. HEAD: Normal cephalic/atraumatic. Extra-occular motions intact bilaterally. NECK: No lymphadenopathy or bruits. CHEST: Rises equally on inspiration. Clear to auscultation bilaterally. CARDIOVASCULAR: Regular rate and rhythm without murmurs, rubs or gallops. ABDOMEN: Soft, nontender, and nondistended with normal bowel sounds. No Hepatosplemomegaly. UPPER EXTREMITIES: no cyanosis, clubbing, or edema. DERM: no rash or jaundice. LOWER EXTREMITIES: no cyanosis, clubbing, or edema. NEURO: Alert and oriented times four. Patient moves all extremities and has gross sensation in all extremities.     ASA: ASA 3 - Patient with moderate systemic disease with functional limitations    MEDICATION:    MAC/Propofol/Anesthesia:  Yes     Photo:  Yes  Biopsy:  Yes      Description of Procedure: The risks and benefits of the procedure were described to the patient or their representative if unable to give consent including but not limited to bleeding, infection, poking a hole someplace requiring surgery to fix it, having a reaction to medication, and death. The patient or their representative if unable to give consent understood these risks and provided informed consent. Airway was assessed and is adequate for the planned sedation. Anesthesia: MAC  Estimated Blood Loss: less than 50   Complications: None  Specimens: Was Obtained:  see below     Sedation was administered by anesthesia who monitored the patient during the procedure. The patient was placed in the left lateral decubitus position. The perianal area was inspected, and a digital rectal exam was performed. A forward-viewing Olympus adult colonoscope was lubricated and inserted through the anus into the rectum. Under direct visualization, this was advanced to the cecum. Cecal base was identified by the ileocecal valve and appendiceal orifice. The scope was then slowly withdrawn with good views of mucosal surfaces. The scope was retroflexed in the rectum. Findings and maneuvers are listed in impression below. The patient tolerated the procedure well. The scope was removed. The patient was moved to the recovery area. There were no immediate complications. IMPRESSION:  1. Five 4-8 mm ascending colon polyps removed with cold snare  2. Four 4-6 mm transverse colon polyps removed with cold snare  3.  5-6 mm descending colon polyp removed with cold snare   4. Numerous hyperplastic rectal polyps. Five 4-6 mm polyps removed with cold snare. 5.  Transverse colon diverticulosis     RECOMMENDATIONS:    1. High Fiber diet  2. Await pathology results   3.   Repeat colonoscopy in 1-3 years depending upon pathology results  4.   Resume Plavix and Eliquis tomorrow    Manuel Escalona MD  8:41 AM 1/30/2023

## 2023-01-30 NOTE — DISCHARGE INSTRUCTIONS
1.  High Fiber diet>see diet  2. Await pathology results   3. Repeat colonoscopy in 1-3 years depending upon pathology results>office will call you  4.   Resume Plavix and Eliquis tomorrow

## 2023-01-30 NOTE — ANESTHESIA PRE PROCEDURE
Department of Anesthesiology  Preprocedure Note       Name:  Rogelio Vyas   Age:  63 y.o.  :  1960                                          MRN:  [id number]         Date:  2023      Surgeon: Gabby Sexton):  Trinidad Tiwari MD    Procedure: Procedure(s):  COLONOSCOPY    Medications prior to admission:   Prior to Admission medications    Medication Sig Start Date End Date Taking? Authorizing Provider   HYDROcodone-acetaminophen (NORCO) 5-325 MG per tablet Take 1 tablet by mouth 2 times daily. Yes Historical Provider, MD   metoprolol tartrate (LOPRESSOR) 50 MG tablet Take 50 mg by mouth 2 times daily   Yes Historical Provider, MD   glipiZIDE (GLUCOTROL) 5 MG tablet Take 5 mg by mouth 2 times daily (before meals)   Yes Historical Provider, MD   tamsulosin (FLOMAX) 0.4 MG capsule Take 0.4 mg by mouth daily   Yes Historical Provider, MD   atorvastatin (LIPITOR) 80 MG tablet Take 80 mg by mouth daily   Yes Historical Provider, MD   nitroGLYCERIN (NITROSTAT) 0.4 MG SL tablet Place 0.4 mg under the tongue every 5 minutes as needed for Chest pain up to max of 3 total doses. If no relief after 1 dose, call 911.    Yes Historical Provider, MD   albuterol sulfate HFA (PROVENTIL;VENTOLIN;PROAIR) 108 (90 Base) MCG/ACT inhaler Inhale 2 puffs into the lungs every 6 hours as needed for Wheezing   Yes Historical Provider, MD   isosorbide mononitrate (IMDUR) 60 MG extended release tablet Take 60 mg by mouth daily   Yes Historical Provider, MD   dilTIAZem (DILACOR XR) 240 MG extended release capsule Take 240 mg by mouth daily   Yes Historical Provider, MD   Umeclidinium-Vilanterol (ANORO ELLIPTA) 62.5-25 MCG/ACT AEPB Inhale into the lungs   Yes Historical Provider, MD   clopidogrel (PLAVIX) 75 MG tablet Take 75 mg by mouth daily   Yes Historical Provider, MD   ezetimibe (ZETIA) 10 MG tablet Take 10 mg by mouth daily   Yes Historical Provider, MD   metFORMIN (GLUCOPHAGE) 1000 MG tablet Take 500 mg by mouth 2 times daily (with meals)   Yes Historical Provider, MD   apixaban (ELIQUIS) 5 MG TABS tablet Take by mouth 2 times daily   Yes Historical Provider, MD   omeprazole (PRILOSEC) 20 MG delayed release capsule Take 20 mg by mouth daily   Yes Historical Provider, MD       Current medications:    Current Facility-Administered Medications   Medication Dose Route Frequency Provider Last Rate Last Admin    0.9 % sodium chloride infusion   IntraVENous Continuous Sha Ramon MD           Allergies:  No Known Allergies    Problem List:  There is no problem list on file for this patient.       Past Medical History:        Diagnosis Date    Arthritis     CAD (coronary artery disease)     Cancer ([facility]     COPD (chronic obstructive pulmonary disease) ([facility]     Diabetes mellitus ([facility]     Hyperlipidemia     Hypertension        Past Surgical History:        Procedure Laterality Date    CARDIAC SURGERY      CORONARY STENT PLACEMENT      FRACTURE SURGERY         Social History:    Social History     Tobacco Use    Smoking status: Every Day     Packs/day: 0.50     Years: 45.00     Pack years: 22.50     Types: Cigarettes    Smokeless tobacco: Never   Substance Use Topics    Alcohol use: Yes     Comment: occasionnally                                Ready to quit: Not Answered  Counseling given: Not Answered      Vital Signs (Current):   Vitals:    01/27/23 1046 01/30/23 0727 01/30/23 0729   BP:  115/68    Pulse:  62    Resp:  18    Temp:  36 °C (96.8 °F)    TempSrc:  Temporal    SpO2:  90% 93%   Weight:  221 lb 9.6 oz (100.5 kg)    Height: 5' 9\" (1.753 m) 5' 9\" (1.753 m)                                               BP Readings from Last 3 Encounters:   01/30/23 115/68   01/30/14 181/75       NPO Status: Time of last liquid consumption: 0030                        Time of last solid consumption: 1630                        Date of last liquid consumption: 01/30/23                        Date of last solid food consumption: 01/27/23    BMI:   Wt Readings from Last 3 Encounters:   01/30/23 221 lb 9.6 oz (100.5 kg)     Body mass index is 32.72 kg/m². CBC: No results found for: WBC, RBC, HGB, HCT, MCV, RDW, PLT    CMP: No results found for: NA, K, CL, CO2, BUN, CREATININE, GFRAA, AGRATIO, LABGLOM, GLUCOSE, GLU, PROT, CALCIUM, BILITOT, ALKPHOS, AST, ALT    POC Tests: No results for input(s): POCGLU, POCNA, POCK, POCCL, POCBUN, POCHEMO, POCHCT in the last 72 hours. Coags: No results found for: PROTIME, INR, APTT    HCG (If Applicable): No results found for: PREGTESTUR, PREGSERUM, HCG, HCGQUANT     ABGs: No results found for: PHART, PO2ART, VAH2EHM, QXQ4NNJ, BEART, Z9IGUSMG     Type & Screen (If Applicable):  No results found for: LABABO, LABRH    Drug/Infectious Status (If Applicable):  No results found for: HIV, HEPCAB    COVID-19 Screening (If Applicable): No results found for: COVID19        Anesthesia Evaluation  Patient summary reviewed  Airway: Mallampati: II  TM distance: >3 FB   Neck ROM: full     Dental:    (+) edentulous      Pulmonary:   (+) COPD: moderate,  decreased breath sounds: bilateral current smoker          Patient did not smoke on day of surgery. Cardiovascular:  Exercise tolerance: poor (<4 METS),   (+) hypertension: no interval change, CAD: no interval change, CABG/stent: no interval change, dysrhythmias: atrial fibrillation, hyperlipidemia        Rhythm: regular  Rate: normal                    Neuro/Psych:   Negative Neuro/Psych ROS              GI/Hepatic/Renal:   (+) GERD: well controlled, bowel prep,           Endo/Other:    (+) DiabetesType II DM, well controlled, , : arthritis:., malignancy/cancer. Abdominal:             Vascular: negative vascular ROS. Other Findings:           Anesthesia Plan      MAC     ASA 3       Induction: intravenous. Anesthetic plan and risks discussed with patient and spouse.       Plan discussed with attending.                     Fransisco Mckeon, APRN - CRNA   1/30/2023

## 2024-09-20 ENCOUNTER — OFFICE VISIT (OUTPATIENT)
Dept: PHYSICAL MEDICINE AND REHAB | Age: 64
End: 2024-09-20
Payer: OTHER GOVERNMENT

## 2024-09-20 VITALS
DIASTOLIC BLOOD PRESSURE: 64 MMHG | SYSTOLIC BLOOD PRESSURE: 96 MMHG | WEIGHT: 232 LBS | BODY MASS INDEX: 34.36 KG/M2 | HEIGHT: 69 IN

## 2024-09-20 DIAGNOSIS — G89.4 CHRONIC PAIN SYNDROME: ICD-10-CM

## 2024-09-20 DIAGNOSIS — M47.816 LUMBAR SPONDYLOSIS: Primary | ICD-10-CM

## 2024-09-20 DIAGNOSIS — M53.3 SACROILIAC JOINT DYSFUNCTION OF LEFT SIDE: ICD-10-CM

## 2024-09-20 DIAGNOSIS — D17.79 EPIDURAL LIPOMATOSIS: ICD-10-CM

## 2024-09-20 PROCEDURE — 99204 OFFICE O/P NEW MOD 45 MIN: CPT | Performed by: NURSE PRACTITIONER

## 2024-09-20 RX ORDER — FINASTERIDE 5 MG/1
5 TABLET, FILM COATED ORAL 2 TIMES DAILY
COMMUNITY
Start: 2024-01-03

## 2024-09-20 RX ORDER — HYDROXYZINE HYDROCHLORIDE 50 MG/1
50 TABLET, FILM COATED ORAL DAILY
COMMUNITY
Start: 2024-01-03

## 2024-09-20 RX ORDER — SEMAGLUTIDE 0.68 MG/ML
1 INJECTION, SOLUTION SUBCUTANEOUS WEEKLY
COMMUNITY

## 2024-09-20 RX ORDER — LEVOTHYROXINE SODIUM 25 UG/1
75 TABLET ORAL DAILY
COMMUNITY
Start: 2024-01-18

## 2024-09-20 RX ORDER — HYDROCODONE BITARTRATE AND ACETAMINOPHEN 7.5; 325 MG/1; MG/1
1 TABLET ORAL 2 TIMES DAILY PRN
Qty: 60 TABLET | Refills: 0 | Status: SHIPPED | OUTPATIENT
Start: 2024-10-07 | End: 2024-11-06

## 2024-09-20 RX ORDER — PREGABALIN 75 MG/1
CAPSULE ORAL
Qty: 60 CAPSULE | Refills: 0 | Status: SHIPPED | OUTPATIENT
Start: 2024-09-20 | End: 2024-10-20

## 2024-10-11 ENCOUNTER — OFFICE VISIT (OUTPATIENT)
Dept: PHYSICAL MEDICINE AND REHAB | Age: 64
End: 2024-10-11
Payer: OTHER GOVERNMENT

## 2024-10-11 ENCOUNTER — TELEPHONE (OUTPATIENT)
Dept: PHYSICAL MEDICINE AND REHAB | Age: 64
End: 2024-10-11

## 2024-10-11 VITALS
HEIGHT: 69 IN | SYSTOLIC BLOOD PRESSURE: 94 MMHG | WEIGHT: 232 LBS | DIASTOLIC BLOOD PRESSURE: 56 MMHG | BODY MASS INDEX: 34.36 KG/M2

## 2024-10-11 DIAGNOSIS — M53.3 SACROILIAC JOINT DYSFUNCTION OF LEFT SIDE: ICD-10-CM

## 2024-10-11 DIAGNOSIS — D17.79 EPIDURAL LIPOMATOSIS: Primary | ICD-10-CM

## 2024-10-11 DIAGNOSIS — G89.4 CHRONIC PAIN SYNDROME: ICD-10-CM

## 2024-10-11 DIAGNOSIS — M47.816 LUMBAR SPONDYLOSIS: ICD-10-CM

## 2024-10-11 PROCEDURE — 99214 OFFICE O/P EST MOD 30 MIN: CPT | Performed by: NURSE PRACTITIONER

## 2024-10-11 RX ORDER — PREGABALIN 75 MG/1
150 CAPSULE ORAL NIGHTLY
Qty: 60 CAPSULE | Refills: 0 | Status: SHIPPED | OUTPATIENT
Start: 2024-10-11 | End: 2024-11-10

## 2024-10-11 RX ORDER — HYDROCODONE BITARTRATE AND ACETAMINOPHEN 7.5; 325 MG/1; MG/1
1 TABLET ORAL 2 TIMES DAILY PRN
Qty: 60 TABLET | Refills: 0 | Status: SHIPPED | OUTPATIENT
Start: 2024-10-11 | End: 2024-11-10

## 2024-10-11 NOTE — PROGRESS NOTES
Normocephalic, atraumatic   Eyes: Conjunctivae normal   Neck: Supple, symmetrical   Lungs: Normal respiratory effort, non-labored breathing   Cardiovascular: Limbs warm and well perfused   Abdomen: Non-protruded   Musculoskeletal: Muscle bulk symmetric, no atrophy, no gross deformities   · Lower Extremities: ROM WNL.   · Thorax: No paraspinal tenderness bilaterally. No scoliosis or kyphosis.   · Lumbar Spine: ROM WNL. Lumbar paraspinals mildly tender to palpation bilaterally. SLR neg bilaterally. MARTA positive left. GAENSLEN positive left.  Positive facet loading bilaterally.  Left SI joint tender to palpation.  Left greater trochanter tender to palpation.   Neurological: Cranial nerves II-XII grossly intact.   · Gait - Antalgic gait. Ambulates without assistive device.   · Motor: 5/5 muscle strength in bilateral hip flexion, knee flexion, knee extension, ankle dorsiflexion, and ankle plantar flexion   · Sensory: LT sensation intact in lower limbs   · Reflexes: 2+ symmetrical in bilateral achilles, 2+ bilateral patellar, negative ankle clonus, downgoing babinski   Skin: No rashes or lesions present   Psychological: Cooperative, no exaggerated pain behaviors       Assessment:    Diagnosis Orders   1. Epidural lipomatosis        2. Lumbar spondylosis  HYDROcodone-acetaminophen (NORCO) 7.5-325 MG per tablet    pregabalin (LYRICA) 75 MG capsule    SCHEDULE PROCEDURE    SCHEDULE PROCEDURE    SCHEDULE PROCEDURE      3. Sacroiliac joint dysfunction of left side        4. Chronic pain syndrome            Jose Romero is a 64 y.o.male presenting to the pain clinic for evaluation of low back pain.  Patient's history and physical consistent with lumbar facet mediated pain as well as myofascial and left SI joint dysfunction.  He has failed to respond to Lyrica and is open to injections.  I have set him up for a lumbar medial branch block targeting bilateral L3-4, L4-5, L5-S1 with Dr. Nelson with fluoroscopy.  We discussed in

## 2024-10-30 ENCOUNTER — TELEPHONE (OUTPATIENT)
Dept: PHYSICAL MEDICINE AND REHAB | Age: 64
End: 2024-10-30

## 2024-10-30 NOTE — TELEPHONE ENCOUNTER
Patient had a pacemaker put in on Monday.  He is wondering if he is ok to have his bilateral L 3-4, 4-5, 5-sacral 1 medial branch block on 11/14/2024.  His post op appt is 11/08.  Please advise.

## 2024-11-08 NOTE — DISCHARGE INSTRUCTIONS
Post procedure Instructions:    No driving or making significant decisions for the remainder of the day.   Be cautions with walking and activity for 24 hours, do not over exert yourself.  Ok to resume pre-procedure activity level today.  Apply ice to site of injection site if you have pain or discomfort.  Do not submerge sit of injection during bath or pool activities for 48 hours post-procedure.  Resume blood thinning medications in 24 hours.     Call office [phone] if you have:  Temperature greater than 100.4  Persistent nausea and vomiting  Severe uncontrolled pain  Redness, tenderness, or signs of infection (pain, swelling, redness, odor or green/yellow discharge around the site)  Difficulty breathing, headache or visual disturbances  Hives  Persistent dizziness or light-headedness  Extreme fatigue  Any other questions or concerns you may have after discharge    In an emergency, call 911 or go to an Emergency Department at a nearby hospital    “Surgical Site Infections”      How can we work together to prevent Surgical Site Infections?   We would like to thank you for choosing [facility] for your Surgical Care.  Below you will find helpful information on how we can work together to prevent Surgical Site Infections.    What is a Surgical Site Infection (SSI)?  A surgical site infection is an infection that occurs after surgery in the part of the body where the surgery took place. Most patients who have surgery do not develop an infection. However, infections develop in about 1 to 3 out of every 100 patients who have surgery.  Some of the common symptoms of a surgical site infection are:  Redness and pain around the area where you had surgery  Drainage of cloudy fluid from your surgical wound  Fever    Can SSIs be treated?  Yes. Most surgical site infections can be treated with antibiotics. The antibiotic given to you depends on the bacteria (germs) causing the infection. Sometimes patients with

## 2024-11-13 DIAGNOSIS — M47.816 LUMBAR SPONDYLOSIS: ICD-10-CM

## 2024-11-13 RX ORDER — HYDROCODONE BITARTRATE AND ACETAMINOPHEN 7.5; 325 MG/1; MG/1
1 TABLET ORAL 2 TIMES DAILY PRN
Qty: 60 TABLET | Refills: 0 | Status: SHIPPED | OUTPATIENT
Start: 2024-11-13 | End: 2024-12-16 | Stop reason: SDUPTHER

## 2024-11-13 RX ORDER — PREGABALIN 75 MG/1
150 CAPSULE ORAL NIGHTLY
Qty: 60 CAPSULE | Refills: 0 | Status: SHIPPED | OUTPATIENT
Start: 2024-11-13 | End: 2025-01-15

## 2024-11-13 NOTE — TELEPHONE ENCOUNTER
OARRS reviewed. UDS: No UDS on file.   Last seen: 10/11/2024. Follow-up:   Future Appointments   Date Time Provider Department Center   11/21/2024  8:00 AM Elizabeth Wiley APRN - CNP [facility] [place]

## 2024-11-13 NOTE — TELEPHONE ENCOUNTER
Jose Romero called requesting a refill on the following medications:  Requested Prescriptions     Pending Prescriptions Disp Refills    pregabalin (LYRICA) 75 MG capsule 60 capsule 0     Sig: Take 2 capsules by mouth at bedtime for 30 days. Max Daily Amount: 150 mg    HYDROcodone-acetaminophen (NORCO) 7.5-325 MG per tablet 60 tablet 0     Sig: Take 1 tablet by mouth 2 times daily as needed for Pain for up to 30 days. Max Daily Amount: 2 tablets     Pharmacy verified:    [id number] - [place], [place] - [address] - P [phone] - F [phone]     Date of last visit: 10/11/2024  Date of next visit (if applicable): 11/21/2024

## 2024-11-13 NOTE — H&P
Today, patient presents for planned medial branch blocks at bilateral lumbar 3/4, lumbar 4/5, lumbar 5/sacral 1    This note is reflective of the patient's previous visit for evaluation. We will proceed with today's planned procedure. Since patient's last visit for evaluation, there have been no interval changes in medical history. Patient has no new numbness, weakness, or focal neurological deficit since evaluation. Patient has no contraindications to injection (no anticoagulation or recent antibiotic intake for active infections), and has a  present or is able to drive themselves (as discussed and cleared by physician).  Allergies to latex, contrast dye, and steroid medications have been confirmed with the patient prior to the procedure.  NPO necessity has been assessed and accepted based on procedure complexity. The risks and benefits of the procedure have been explained including but are not limited to infection, bleeding, paralysis, immediate post procedure weakness, and dizziness; the patient acknowledges understanding and desires to proceed with the procedure. Patient has signed consent for same procedure as discussed in previous clinic encounter. All other questions and concerns were addressed at bedside. See procedure note for full details.       Post procedure Instructions: The patient was advised not to drive during the day of the procedure and not to engage in any significant decision making (unless otherwise states by physician). The patient was also advised to be cautious with walking/activity for 24 hours following today's visit and asked not to engage in over-exertion (unless otherwise states by physician). After this time, it is ok to resume pre-procedure level of activity. Patient advised to apply ice to site of injection in situations of pain and discomfort. Patient advised to not submerge site of injection during bath or pool activities for approximately 24 hours post-procedure. Patient

## 2024-11-14 ENCOUNTER — HOSPITAL ENCOUNTER (OUTPATIENT)
Age: 64
Setting detail: OUTPATIENT SURGERY
Discharge: HOME | End: 2024-11-14
Attending: ANESTHESIOLOGY | Admitting: ANESTHESIOLOGY
Payer: OTHER GOVERNMENT

## 2024-11-14 ENCOUNTER — APPOINTMENT (OUTPATIENT)
Dept: GENERAL RADIOLOGY | Age: 64
End: 2024-11-14
Attending: ANESTHESIOLOGY
Payer: OTHER GOVERNMENT

## 2024-11-14 VITALS
BODY MASS INDEX: 33.86 KG/M2 | HEIGHT: 69 IN | TEMPERATURE: 97.9 F | WEIGHT: 228.6 LBS | DIASTOLIC BLOOD PRESSURE: 75 MMHG | SYSTOLIC BLOOD PRESSURE: 126 MMHG | RESPIRATION RATE: 16 BRPM | HEART RATE: 84 BPM | OXYGEN SATURATION: 94 %

## 2024-11-14 PROBLEM — M47.816 LUMBAR SPONDYLOSIS: Status: ACTIVE | Noted: 2024-11-14

## 2024-11-14 LAB — GLUCOSE BLD STRIP.AUTO-MCNC: 151 MG/DL (ref 70–108)

## 2024-11-14 PROCEDURE — 7100000011 HC PHASE II RECOVERY - ADDTL 15 MIN: Performed by: ANESTHESIOLOGY

## 2024-11-14 PROCEDURE — 82948 REAGENT STRIP/BLOOD GLUCOSE: CPT

## 2024-11-14 PROCEDURE — 3600000058 HC PAIN LEVEL 5 BASE: Performed by: ANESTHESIOLOGY

## 2024-11-14 PROCEDURE — 7100000010 HC PHASE II RECOVERY - FIRST 15 MIN: Performed by: ANESTHESIOLOGY

## 2024-11-14 PROCEDURE — 2500000003 HC RX 250 WO HCPCS: Performed by: ANESTHESIOLOGY

## 2024-11-14 PROCEDURE — 2709999900 HC NON-CHARGEABLE SUPPLY: Performed by: ANESTHESIOLOGY

## 2024-11-14 PROCEDURE — 6360000002 HC RX W HCPCS: Performed by: ANESTHESIOLOGY

## 2024-11-14 PROCEDURE — 99152 MOD SED SAME PHYS/QHP 5/>YRS: CPT | Performed by: ANESTHESIOLOGY

## 2024-11-14 RX ORDER — MIDAZOLAM HYDROCHLORIDE 1 MG/ML
INJECTION, SOLUTION INTRAMUSCULAR; INTRAVENOUS PRN
Status: DISCONTINUED | OUTPATIENT
Start: 2024-11-14 | End: 2024-11-14 | Stop reason: ALTCHOICE

## 2024-11-14 RX ORDER — FENTANYL CITRATE 50 UG/ML
INJECTION, SOLUTION INTRAMUSCULAR; INTRAVENOUS PRN
Status: DISCONTINUED | OUTPATIENT
Start: 2024-11-14 | End: 2024-11-14 | Stop reason: ALTCHOICE

## 2024-11-14 RX ORDER — LIDOCAINE HYDROCHLORIDE 10 MG/ML
INJECTION, SOLUTION EPIDURAL; INFILTRATION; INTRACAUDAL; PERINEURAL PRN
Status: DISCONTINUED | OUTPATIENT
Start: 2024-11-14 | End: 2024-11-14 | Stop reason: ALTCHOICE

## 2024-11-14 RX ORDER — BUPIVACAINE HYDROCHLORIDE 5 MG/ML
INJECTION, SOLUTION PERINEURAL PRN
Status: DISCONTINUED | OUTPATIENT
Start: 2024-11-14 | End: 2024-11-14 | Stop reason: ALTCHOICE

## 2024-11-14 ASSESSMENT — PAIN - FUNCTIONAL ASSESSMENT
PAIN_FUNCTIONAL_ASSESSMENT: 0-10
PAIN_FUNCTIONAL_ASSESSMENT: 0-10

## 2024-11-14 NOTE — PROCEDURES
Pre-operative Diagnosis: Lumbar spondylosis     Post-operative Diagnosis: Lumbar spondylosis     Procedure: Bilateral lumbar medial branch blocks targeting facet joints of L3/L4, L4/L5, and L5/S1     Procedure Description:  After consent was obtained, the patient was placed in the prone position with a pillow under the abdomen to reduce the lordotic curve of the lumbar spine.  The lower back was prepped with chloraprep and draped in a sterile fashion. Then, 0.5 cc of 1 % lidocaine was used for local anesthesia of the skin and superficial subcutaneous tissues.  Four 22-gauge 3.5-inch spinal needles were advanced under fluoroscopy in an AP view: one at the sacral ala and two at the junction of the right superior articular process and the transverse process of the L3, L4 and L5 vertebrae. There was no paresthesias, heme, or CSF obtained.  Needle placement was confirmed using AP and oblique views. Then, 0.5 cc of 0.5% bupivacaine was injected at each site without complications. The procedure was repeated on the contralateral side. The patient tolerated the procedure well, transported to the recovery room, and discharged in ambulatory fashion.     Procedural Complications: None  Estimated Blood Loss: 0 mL      IV sedation was used during the procedure:  - Moderate intravenous conscious sedation was supervised by Dr. Nelson  - The patient was independently monitored by a Registered Nurse assigned to the procedure room  - Monitoring included automated blood pressure, continuous EKG, and continuous pulse oximetry  - The detailed conscious record is permanently stored in the Hospital Information System  - The following is the conscious sedation record:  Start Time: 08:19  End Time : 08:34  Duration: 15 minutes   Medications Administered: 1 mg Versed, 50 mcg Fentanyl     Jamie Nelson DO  Interventional Pain Management/PM&R   [facility]

## 2024-11-14 NOTE — PRE SEDATION
[facility]  Pre-Sedation/Analgesia History & Physical    Pt Name: Jose Romero  MRN: [id number]  YOB: 1960  Provider Performing Procedure: Jamie Nelson DO   Primary Care Physician: No primary care provider on file.      MEDICAL HISTORY       has a past medical history of Arthritis, CAD (coronary artery disease), Cancer (HCC), COPD (chronic obstructive pulmonary disease) (HCC), Diabetes mellitus (HCC), Hyperlipidemia, and Hypertension.  SURGICAL HISTORY   has a past surgical history that includes fracture surgery; Cardiac surgery; Coronary stent placement; Colonoscopy (N/A, 01/30/2023); Cardiac catheterization (05/2024); and Pacemaker insertion (11/04/2024).    ALLERGIES   Allergies as of 10/14/2024 - Fully Reviewed 10/11/2024   Allergen Reaction Noted    Olodaterol Hives 07/03/2024    Tiotropium Hives 07/03/2024       MEDICATIONS   Prior to Admission medications    Medication Sig Start Date End Date Taking? Authorizing Provider   pregabalin (LYRICA) 75 MG capsule Take 2 capsules by mouth at bedtime for 30 days. Max Daily Amount: 150 mg 11/13/24 12/13/24 Yes Elizabeth Wiley APRN - CNP   HYDROcodone-acetaminophen (NORCO) 7.5-325 MG per tablet Take 1 tablet by mouth 2 times daily as needed for Pain for up to 30 days. Max Daily Amount: 2 tablets 11/13/24 12/13/24 Yes Elizabeth Wiley APRN - CNP   hydrOXYzine HCl (ATARAX) 50 MG tablet Take 1 tablet by mouth daily 1/3/24  Yes Provider, Historical, MD   finasteride (PROSCAR) 5 MG tablet Take 1 tablet by mouth in the morning and at bedtime 1/3/24  Yes Provider, Historical, MD   vitamin D3 (CHOLECALCIFEROL) 125 MCG (5000 UT) TABS tablet Take by mouth 2/7/24  Yes Provider, MD Chelsea   empagliflozin (JARDIANCE) 25 MG tablet Take 10 mg by mouth daily 1/3/24  Yes Provider, Historical, MD   levothyroxine (SYNTHROID) 25 MCG tablet Take 3 tablets by mouth Daily 1/18/24  Yes Provider, MD Chelsea   metoprolol tartrate (LOPRESSOR) 50 MG

## 2024-11-14 NOTE — PROGRESS NOTES
0826: Patient to phase 2 recovery room via cart. Patient is awake and alert. Report received from surgical RN, Charlene. Patient's vitals obtained, see charting.   0828: IV is clean, dry and intact- see LDA. Patient is denying nausea and rating pain a \"4\"/10 in his back/hips.   0831: Offered drink and snack provided to the patient. Bed is in the lowest position and call light is within reach.   0838: Discharge instructions reviewed with patient.   0841: IV removed at this time- no complications and dressing applied. Patient dressing.   0846: Patient ambulated to the car and discharged home in stable condition with his wife.

## 2024-11-14 NOTE — POST SEDATION
[facility]  Sedation/Analgesia Post Sedation Record    Pt Name: Jose Romero  MRN: [id number]  YOB: 1960  Procedure Performed By: Jamie Nelson DO  Primary Care Physician: No primary care provider on file.    POST-PROCEDURE    Physicians/Assistants: Jamie Nelson DO  Procedure Performed: See Procedure Note   Sedation/Anesthesia: Versed and Fentanyl (See procedure note for amount and duration)  Estimated Blood Loss:     0  ml  Specimens Removed: None        Complications: None           Jamie Nelson DO  Electronically signed 11/14/2024 at 9:57 AM

## 2024-11-21 ENCOUNTER — OFFICE VISIT (OUTPATIENT)
Dept: PHYSICAL MEDICINE AND REHAB | Age: 64
End: 2024-11-21
Payer: OTHER GOVERNMENT

## 2024-11-21 VITALS
SYSTOLIC BLOOD PRESSURE: 108 MMHG | HEIGHT: 69 IN | BODY MASS INDEX: 33.77 KG/M2 | DIASTOLIC BLOOD PRESSURE: 58 MMHG | WEIGHT: 228 LBS

## 2024-11-21 DIAGNOSIS — G89.4 CHRONIC PAIN SYNDROME: ICD-10-CM

## 2024-11-21 DIAGNOSIS — D17.79 EPIDURAL LIPOMATOSIS: ICD-10-CM

## 2024-11-21 DIAGNOSIS — M47.816 LUMBAR SPONDYLOSIS: Primary | ICD-10-CM

## 2024-11-21 DIAGNOSIS — M53.3 SACROILIAC JOINT DYSFUNCTION OF LEFT SIDE: ICD-10-CM

## 2024-11-21 PROCEDURE — 99214 OFFICE O/P EST MOD 30 MIN: CPT | Performed by: NURSE PRACTITIONER

## 2024-11-21 RX ORDER — GABAPENTIN 300 MG/1
CAPSULE ORAL
Qty: 60 CAPSULE | Refills: 0 | Status: SHIPPED | OUTPATIENT
Start: 2024-11-21 | End: 2024-12-21

## 2024-11-21 NOTE — PROGRESS NOTES
Functionality Assessment/Goals Worksheet     On a scale of 0 (Does not Interfere) to 10 (Completely Interferes)     1.  Which number describes how during the past week pain has interfered with           the following:  A.  General Activity:  7  B.  Mood: 9  C.  Walking Ability:  7  D.  Normal Work (Includes both work outside the home and housework):  8  E.  Relations with Other People:   7  F.  Sleep:   10  G.  Enjoyment of Life:   9    2.  Patient Prefers to Take their Pain Medications:     []  On a regular basis   [x]  Only when necessary    []  Does not take pain medications    3.  What are the Patient's Goals/Expectations for Visiting Pain Management?     [x]  Learn about my pain    []  Receive Medication   []  Physical Therapy     []  Treat Depression   []  Receive Injections    []  Treat Sleep   []  Deal with Anxiety and Stress   []  Treat Opoid Dependence/Addiction   []  Other:                                
achilles, 2+ bilateral patellar, negative ankle clonus, downgoing babinski   Skin: No rashes or lesions present   Psychological: Cooperative, no exaggerated pain behaviors       Assessment:    Diagnosis Orders   1. Lumbar spondylosis  SCHEDULE PROCEDURE    SCHEDULE PROCEDURE    SCHEDULE PROCEDURE    SCHEDULE PROCEDURE      2. Epidural lipomatosis        3. Sacroiliac joint dysfunction of left side        4. Chronic pain syndrome              Jose Romero is a 64 y.o.male presenting to the pain clinic for evaluation of low back pain.  Patient's history and physical consistent with lumbar facet mediated pain as well as myofascial and left SI joint dysfunction.  He has failed to respond to Lyrica and is open to injections.  He had a questionable response to the lumbar medial branch block targeting bilateral L3-4, L4-5, L5-S1.  I have set him up for the lumbar radiofrequency ablation targeting these levels with Dr. Nelson with fluoroscopy.  We will have to obtain clearance from cardiology for him to hold Plavix and Eliquis.  We discussed that he also has left SI joint dysfunction and may benefit from a left SI joint injection.  I have stopped his Lyrica and started him on gabapentin 300 mg nightly x 7 days and increase to twice daily if tolerated. I continued Norco 7.5-325 mg twice daily as needed.      Plan:   The following treatment recommendations and plan were discussed in detail with Jose Romero.    Imaging:   I have reviewed patient’s imaging of lumbar MRI and results were discussed with patient today.     Analgesics:   The patient is currently managing their pain with the use of Norco 7.5-325 mg twice daily as needed severe pain (>7/10).     Patient is advised to take the medication as prescribed as taking more than prescribed can lead to the development of tolerance, physical dependency, and addiction.  Patient is advised to store and lock the medication in a safe and secure location.  If the medication is

## 2024-11-26 NOTE — DISCHARGE INSTRUCTIONS
Post procedure Instructions:    No driving or making significant decisions for the remainder of the day.   Be cautions with walking and activity for 24 hours, do not over exert yourself.  Ok to resume pre-procedure activity level today.  Apply ice to site of injection site if you have pain or discomfort.  Do not submerge sit of injection during bath or pool activities for 48 hours post-procedure.  Resume blood thinning medications in 24 hours.     Call office [phone] if you have:  Temperature greater than 100.4  Persistent nausea and vomiting  Severe uncontrolled pain  Redness, tenderness, or signs of infection (pain, swelling, redness, odor or green/yellow discharge around the site)  Difficulty breathing, headache or visual disturbances  Hives  Persistent dizziness or light-headedness  Extreme fatigue  Any other questions or concerns you may have after discharge    In an emergency, call 911 or go to an Emergency Department at a nearby hospital    “Surgical Site Infections”      How can we work together to prevent Surgical Site Infections?   We would like to thank you for choosing [facility] for your Surgical Care.  Below you will find helpful information on how we can work together to prevent Surgical Site Infections.    What is a Surgical Site Infection (SSI)?  A surgical site infection is an infection that occurs after surgery in the part of the body where the surgery took place. Most patients who have surgery do not develop an infection. However, infections develop in about 1 to 3 out of every 100 patients who have surgery.  Some of the common symptoms of a surgical site infection are:  Redness and pain around the area where you had surgery  Drainage of cloudy fluid from your surgical wound  Fever    Can SSIs be treated?  Yes. Most surgical site infections can be treated with antibiotics. The antibiotic given to you depends on the bacteria (germs) causing the infection. Sometimes patients with

## 2024-12-04 ENCOUNTER — APPOINTMENT (OUTPATIENT)
Dept: GENERAL RADIOLOGY | Age: 64
End: 2024-12-04
Attending: ANESTHESIOLOGY
Payer: OTHER GOVERNMENT

## 2024-12-04 ENCOUNTER — HOSPITAL ENCOUNTER (OUTPATIENT)
Age: 64
Setting detail: OUTPATIENT SURGERY
Discharge: HOME | End: 2024-12-04
Attending: ANESTHESIOLOGY | Admitting: ANESTHESIOLOGY
Payer: OTHER GOVERNMENT

## 2024-12-04 VITALS
WEIGHT: 223 LBS | DIASTOLIC BLOOD PRESSURE: 61 MMHG | HEART RATE: 82 BPM | HEIGHT: 69 IN | TEMPERATURE: 98 F | BODY MASS INDEX: 33.03 KG/M2 | OXYGEN SATURATION: 95 % | SYSTOLIC BLOOD PRESSURE: 114 MMHG | RESPIRATION RATE: 14 BRPM

## 2024-12-04 LAB — GLUCOSE BLD STRIP.AUTO-MCNC: 148 MG/DL (ref 70–108)

## 2024-12-04 PROCEDURE — 64635 DESTROY LUMB/SAC FACET JNT: CPT | Performed by: ANESTHESIOLOGY

## 2024-12-04 PROCEDURE — 64636 DESTROY L/S FACET JNT ADDL: CPT | Performed by: ANESTHESIOLOGY

## 2024-12-04 PROCEDURE — 6360000002 HC RX W HCPCS: Performed by: ANESTHESIOLOGY

## 2024-12-04 PROCEDURE — 99152 MOD SED SAME PHYS/QHP 5/>YRS: CPT | Performed by: ANESTHESIOLOGY

## 2024-12-04 PROCEDURE — 7100000010 HC PHASE II RECOVERY - FIRST 15 MIN: Performed by: ANESTHESIOLOGY

## 2024-12-04 PROCEDURE — 2709999900 HC NON-CHARGEABLE SUPPLY: Performed by: ANESTHESIOLOGY

## 2024-12-04 PROCEDURE — 3600000058 HC PAIN LEVEL 5 BASE: Performed by: ANESTHESIOLOGY

## 2024-12-04 PROCEDURE — 82948 REAGENT STRIP/BLOOD GLUCOSE: CPT

## 2024-12-04 PROCEDURE — 3600000059 HC PAIN LEVEL 5 ADDL 15 MIN: Performed by: ANESTHESIOLOGY

## 2024-12-04 RX ORDER — FENTANYL CITRATE 50 UG/ML
INJECTION, SOLUTION INTRAMUSCULAR; INTRAVENOUS PRN
Status: DISCONTINUED | OUTPATIENT
Start: 2024-12-04 | End: 2024-12-04 | Stop reason: ALTCHOICE

## 2024-12-04 RX ORDER — LIDOCAINE HYDROCHLORIDE 20 MG/ML
INJECTION, SOLUTION EPIDURAL; INFILTRATION; INTRACAUDAL; PERINEURAL PRN
Status: DISCONTINUED | OUTPATIENT
Start: 2024-12-04 | End: 2024-12-04 | Stop reason: ALTCHOICE

## 2024-12-04 RX ORDER — MIDAZOLAM HYDROCHLORIDE 1 MG/ML
INJECTION, SOLUTION INTRAMUSCULAR; INTRAVENOUS PRN
Status: DISCONTINUED | OUTPATIENT
Start: 2024-12-04 | End: 2024-12-04 | Stop reason: ALTCHOICE

## 2024-12-04 ASSESSMENT — PAIN - FUNCTIONAL ASSESSMENT
PAIN_FUNCTIONAL_ASSESSMENT: 0-10
PAIN_FUNCTIONAL_ASSESSMENT: 0-10

## 2024-12-04 NOTE — PROGRESS NOTES
1119-Patient to Phase II. Report received from surgical RN. Patient awake and alert. Vital signs obtained and stable. Respirations unlabored on room air. Patient denies pain and nausea. Denies numbness and tingling in extremities. Injection site open to air and no drainage noted. Patient instructed to stay in bed. Call light in reach.     1122-snack and drink provided.     1130-Pt discharged home in stable condition. Taken to the car with staff and home with family.

## 2024-12-04 NOTE — FLOWSHEET NOTE
Discharge instructions reviewed with patient and family member.  All questions were addressed and answered.  Patient and family member verbalized understanding of discharge plan.

## 2024-12-04 NOTE — PROCEDURES
Pre-operative Diagnosis: Lumbar facet pain     Post-operative Diagnosis: Lumbar facet pain     Procedure: BILATERAL lumbar thermal radiofrequency ablation targeting facet joints L3/L4, L4/L5, and L5/S1    Procedure Description:  After consent was obtained, the patient was placed in the prone position with a pillow under the abdomen to reduce the lordotic curve of the lumbar spine. The lower back was prepped with chloraprep and draped in a sterile fashion.  Then, 0.5 cc of 1 % lidocaine was used for local anesthesia of the skin and superficial subcutaneous tissues. Four 20-gauge 100mm SMK cannulas with 10-mm active tips were advanced under fluoroscopic guidance in an AP view to the junction of the superior articular process and the transverse process of the L3, L4 and L5 vertebra and at the sacral ala. There were no paresthesias, heme or CSF obtained.  Needle placement was confirmed using AP and oblique views. This procedure was repeated on the contralateral side.      Sensory and motor stimulation at 50Hz and 2Hz and impedance measurements were carried out having reached threshold at:     RIGHT  L3: 0.2V/3V/150-300 Ohms  L4: 0.2V/3V/150-300 Ohms  L5: 0.2V/3V/150-300 Ohms  SA: 0.2V/3V/150-300 Ohms     LEFT  L3: 0.2V/3V/150-300 Ohms  L4: 0.2V/3V/150-300 Ohms  L5: 0.2V/3V/150-300 Ohms  SA: 0.2V/3V/150-300 Ohms        Then, 1cc of 2% Lidocaine was injected at the site. Temperature was then raised to 80 degrees centigrade for 90 seconds with a 15 second temperature ramp. No pain was reported during the lesioning. The needles were then withdrawn without complications. The patient tolerated the procedure well. The patient was transported to the recovery room and discharged in ambulatory fashion.    Procedural Complications: None  Estimated Blood Loss: 0 mL    IV sedation was used during the procedure:  - Moderate intravenous conscious sedation was supervised by Dr. Nelson  - The patient was independently monitored by a

## 2024-12-04 NOTE — PRE SEDATION
[facility]  Pre-Sedation/Analgesia History & Physical    Pt Name: Jose Romero  MRN: [id number]  YOB: 1960  Provider Performing Procedure: Jamie Nelson DO   Primary Care Physician: No primary care provider on file.      MEDICAL HISTORY       has a past medical history of Arthritis, CAD (coronary artery disease), Cancer (HCC), COPD (chronic obstructive pulmonary disease) (HCC), Diabetes mellitus (HCC), Hyperlipidemia, and Hypertension.  SURGICAL HISTORY   has a past surgical history that includes fracture surgery; Cardiac surgery; Coronary stent placement; Colonoscopy (N/A, 01/30/2023); Cardiac catheterization (05/2024); Pacemaker insertion (11/04/2024); Facet joint injection (Bilateral, 11/14/2024); and Pain management procedure (Bilateral, 12/4/2024).    ALLERGIES   Allergies as of 11/21/2024 - Fully Reviewed 11/21/2024   Allergen Reaction Noted    Olodaterol Hives 07/03/2024    Tiotropium Hives 07/03/2024       MEDICATIONS   Prior to Admission medications    Medication Sig Start Date End Date Taking? Authorizing Provider   gabapentin (NEURONTIN) 300 MG capsule 300 mg nightly x 7 days then BID if tolerated 11/21/24 12/21/24 Yes Elizabeth Wiley APRN - CNP   pregabalin (LYRICA) 75 MG capsule Take 2 capsules by mouth at bedtime for 30 days. Max Daily Amount: 150 mg 11/13/24 12/13/24 Yes Elizabeth Wiley APRN - CNP   HYDROcodone-acetaminophen (NORCO) 7.5-325 MG per tablet Take 1 tablet by mouth 2 times daily as needed for Pain for up to 30 days. Max Daily Amount: 2 tablets 11/13/24 12/13/24 Yes Elizabeth Wiley APRN - CNP   finasteride (PROSCAR) 5 MG tablet Take 1 tablet by mouth in the morning and at bedtime 1/3/24  Yes Chelsea Juan MD   Semaglutide,0.25 or 0.5MG/DOS, (OZEMPIC, 0.25 OR 0.5 MG/DOSE,) 2 MG/3ML SOPN Inject 1 mg into the skin once a week   Yes ProviderChelsea MD   vitamin D3 (CHOLECALCIFEROL) 125 MCG (5000 UT) TABS tablet Take by mouth 2/7/24  Yes

## 2024-12-04 NOTE — POST SEDATION
[facility]  Sedation/Analgesia Post Sedation Record    Pt Name: Jose Romero  MRN: [id number]  YOB: 1960  Procedure Performed By: Jamie Nelson DO  Primary Care Physician: No primary care provider on file.    POST-PROCEDURE    Physicians/Assistants: Jamie Nelson DO  Procedure Performed: See Procedure Note   Sedation/Anesthesia: Versed and Fentanyl (See procedure note for amount and duration)  Estimated Blood Loss:     0  ml  Specimens Removed: None        Complications: None           Jamie Nelson DO  Electronically signed 12/4/2024 at 2:32 PM

## 2024-12-16 DIAGNOSIS — M47.816 LUMBAR SPONDYLOSIS: ICD-10-CM

## 2024-12-16 RX ORDER — HYDROCODONE BITARTRATE AND ACETAMINOPHEN 7.5; 325 MG/1; MG/1
1 TABLET ORAL 2 TIMES DAILY PRN
Qty: 60 TABLET | Refills: 0 | Status: SHIPPED | OUTPATIENT
Start: 2024-12-16 | End: 2025-01-15 | Stop reason: SDUPTHER

## 2024-12-16 NOTE — TELEPHONE ENCOUNTER
Jose Romero called requesting a refill on the following medications:  Requested Prescriptions     Pending Prescriptions Disp Refills    HYDROcodone-acetaminophen (NORCO) 7.5-325 MG per tablet 60 tablet 0     Sig: Take 1 tablet by mouth 2 times daily as needed for Pain for up to 30 days. Max Daily Amount: 2 tablets     Pharmacy verified:[facility], [place], [address] Ph. [phone]  .pv      Date of last visit: 11.21.2024  Date of next visit (if applicable): 01.15.2025

## 2024-12-16 NOTE — TELEPHONE ENCOUNTER
OARRS reviewed. UDS: No UDS on file.   Last seen: 11/21/2024. Follow-up:   Future Appointments   Date Time Provider Department Center   1/15/2025  8:00 AM Elizabeth Wiley APRN - CNP [facility] [place]

## 2024-12-19 RX ORDER — GABAPENTIN 300 MG/1
300 CAPSULE ORAL 2 TIMES DAILY
Qty: 60 CAPSULE | Refills: 0 | Status: SHIPPED | OUTPATIENT
Start: 2024-12-19 | End: 2025-01-15 | Stop reason: SDUPTHER

## 2025-01-06 DIAGNOSIS — M47.816 LUMBAR SPONDYLOSIS: ICD-10-CM

## 2025-01-06 RX ORDER — GABAPENTIN 300 MG/1
300 CAPSULE ORAL 2 TIMES DAILY
Qty: 60 CAPSULE | Refills: 0 | OUTPATIENT
Start: 2025-01-06 | End: 2025-02-05

## 2025-01-06 RX ORDER — HYDROCODONE BITARTRATE AND ACETAMINOPHEN 7.5; 325 MG/1; MG/1
1 TABLET ORAL 2 TIMES DAILY PRN
Qty: 60 TABLET | Refills: 0 | OUTPATIENT
Start: 2025-01-06 | End: 2025-02-05

## 2025-01-06 NOTE — TELEPHONE ENCOUNTER
Jose Romero called requesting a refill on the following medications:  Requested Prescriptions     Pending Prescriptions Disp Refills    HYDROcodone-acetaminophen (NORCO) 7.5-325 MG per tablet 60 tablet 0     Sig: Take 1 tablet by mouth 2 times daily as needed for Pain for up to 30 days. Max Daily Amount: 2 tablets    gabapentin (NEURONTIN) 300 MG capsule 60 capsule 0     Sig: Take 1 capsule by mouth in the morning and at bedtime for 30 days.     Pharmacy verified:[facility]    .pv      Date of last visit: 11/21/25   Date of next visit (if applicable): 1/15/2025

## 2025-01-15 ENCOUNTER — OFFICE VISIT (OUTPATIENT)
Dept: PHYSICAL MEDICINE AND REHAB | Age: 65
End: 2025-01-15
Payer: OTHER GOVERNMENT

## 2025-01-15 VITALS
WEIGHT: 223.11 LBS | HEIGHT: 69 IN | BODY MASS INDEX: 33.04 KG/M2 | SYSTOLIC BLOOD PRESSURE: 108 MMHG | DIASTOLIC BLOOD PRESSURE: 76 MMHG

## 2025-01-15 DIAGNOSIS — M53.3 SI (SACROILIAC) JOINT DYSFUNCTION: Primary | ICD-10-CM

## 2025-01-15 DIAGNOSIS — D17.79 EPIDURAL LIPOMATOSIS: ICD-10-CM

## 2025-01-15 DIAGNOSIS — G89.4 CHRONIC PAIN SYNDROME: ICD-10-CM

## 2025-01-15 DIAGNOSIS — M47.816 LUMBAR SPONDYLOSIS: ICD-10-CM

## 2025-01-15 PROCEDURE — 99214 OFFICE O/P EST MOD 30 MIN: CPT | Performed by: NURSE PRACTITIONER

## 2025-01-15 RX ORDER — HYDROCODONE BITARTRATE AND ACETAMINOPHEN 7.5; 325 MG/1; MG/1
1 TABLET ORAL 2 TIMES DAILY PRN
Qty: 60 TABLET | Refills: 0 | Status: SHIPPED | OUTPATIENT
Start: 2025-01-15 | End: 2025-02-14

## 2025-01-15 RX ORDER — GABAPENTIN 300 MG/1
CAPSULE ORAL
Qty: 90 CAPSULE | Refills: 0 | Status: SHIPPED | OUTPATIENT
Start: 2025-01-15 | End: 2025-02-15

## 2025-01-15 NOTE — PROGRESS NOTES
Chronic Pain/PM&R Clinic Note     Encounter Date: 1/15/25    Subjective:   Chief Complaint:   Chief Complaint   Patient presents with    Follow Up After Procedure     Injections not helping, medication problems with insurance       History of Present Illness:   Jose Romero is a 64 y.o. male seen in the clinic initially on 09/20/2024 upon request from [facility]  for his history of low back pain.  Patient states his low back pain started several years ago without any specific inciting event.  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  He states he had some lifting injuries and falls.  He states the majority of his pain is in his low back, left hip.  He will occasionally get pain from his tailbone down to his posterior lateral knee on the left and occasionally on the right.  He states this does not happen on a daily basis.  His low back pain is constant, he describes this as a dull ache and certain activities make it more intense.  He states pain is aggravated with standing, sitting, walking for extended period of time.  He occasionally gets burning in his back.  He states pain will interfere with sleep as he cannot lay in 1 position for too long he states he will wake up every hour to hour and 15 minutes to adjust positions.  He also sleeps with a CPAP at night.  He states he tried physical therapy back in May which really did not help.  He tried massage therapy and chiropractic care several years ago.  He states he has been on the Norco for several years and it has become less effective recently, he takes this twice a day.  He does not really want to go up on the medication but also \" has to do something\".  He states he was seeing Dr. Franco who recommended spinal cord stimulation but this was denied [facility].  He has not tried any other injections and never had a prior back surgery.  He is leery about injections but may be open to this option.  He states he is scared of

## 2025-01-15 NOTE — PROGRESS NOTES
Functionality Assessment/Goals Worksheet     On a scale of 0 (Does not Interfere) to 10 (Completely Interferes)     1.  Which number describes how during the past week pain has interfered with           the following:  A.  General Activity:  7  B.  Mood: 9  C.  Walking Ability:  8  D.  Normal Work (Includes both work outside the home and housework):  7  E.  Relations with Other People:   5  F.  Sleep:   0  G.  Enjoyment of Life:   7    2.  Patient Prefers to Take their Pain Medications:     [x]  On a regular basis   []  Only when necessary    []  Does not take pain medications    3.  What are the Patient's Goals/Expectations for Visiting Pain Management?     []  Learn about my pain    [x]  Receive Medication   []  Physical Therapy     []  Treat Depression   [x]  Receive Injections    []  Treat Sleep   [x]  Deal with Anxiety and Stress   []  Treat Opoid Dependence/Addiction   [x]  Other:

## 2025-01-27 ENCOUNTER — TELEPHONE (OUTPATIENT)
Dept: PHYSICAL MEDICINE AND REHAB | Age: 65
End: 2025-01-27

## 2025-01-27 NOTE — TELEPHONE ENCOUNTER
Pt called 1/27/25 stating he is going to be admitted into the hospital for Afib. He rescheduled his procedure from 1/29/25 to 2/11/25.

## 2025-02-09 NOTE — H&P
Today, patient presents for planned bilateral sacroiliac joint injection.    This note is reflective of the patient's previous visit for evaluation. We will proceed with today's planned procedure. Since patient's last visit for evaluation, there have been no interval changes in medical history. Patient has no new numbness, weakness, or focal neurological deficit since evaluation. Patient has no contraindications to injection (no anticoagulation or recent antibiotic intake for active infections), and has a  present or is able to drive themselves (as discussed and cleared by physician).  Allergies to latex, contrast dye, and steroid medications have been confirmed with the patient prior to the procedure.  NPO necessity has been assessed and accepted based on procedure complexity. The risks and benefits of the procedure have been explained including but are not limited to infection, bleeding, paralysis, immediate post procedure weakness, and dizziness; the patient acknowledges understanding and desires to proceed with the procedure. Patient has signed consent for same procedure as discussed in previous clinic encounter. All other questions and concerns were addressed at bedside. See procedure note for full details.       Post procedure Instructions: The patient was advised not to drive during the day of the procedure and not to engage in any significant decision making (unless otherwise states by physician). The patient was also advised to be cautious with walking/activity for 24 hours following today's visit and asked not to engage in over-exertion (unless otherwise states by physician). After this time, it is ok to resume pre-procedure level of activity. Patient advised to apply ice to site of injection in situations of pain and discomfort. Patient advised to not submerge site of injection during bath or pool activities for approximately 24 hours post-procedure. Patient attested to understanding post procedure

## 2025-02-11 ENCOUNTER — HOSPITAL ENCOUNTER (OUTPATIENT)
Age: 65
Setting detail: OUTPATIENT SURGERY
Discharge: HOME | End: 2025-02-11
Attending: ANESTHESIOLOGY | Admitting: ANESTHESIOLOGY
Payer: OTHER GOVERNMENT

## 2025-02-11 ENCOUNTER — APPOINTMENT (OUTPATIENT)
Dept: GENERAL RADIOLOGY | Age: 65
End: 2025-02-11
Attending: ANESTHESIOLOGY
Payer: OTHER GOVERNMENT

## 2025-02-11 VITALS
WEIGHT: 219 LBS | BODY MASS INDEX: 32.44 KG/M2 | SYSTOLIC BLOOD PRESSURE: 111 MMHG | TEMPERATURE: 97.7 F | DIASTOLIC BLOOD PRESSURE: 66 MMHG | RESPIRATION RATE: 16 BRPM | OXYGEN SATURATION: 95 % | HEIGHT: 69 IN | HEART RATE: 66 BPM

## 2025-02-11 LAB — GLUCOSE BLD STRIP.AUTO-MCNC: 155 MG/DL (ref 70–108)

## 2025-02-11 PROCEDURE — 99152 MOD SED SAME PHYS/QHP 5/>YRS: CPT | Performed by: ANESTHESIOLOGY

## 2025-02-11 PROCEDURE — 3600000054 HC PAIN LEVEL 3 BASE: Performed by: ANESTHESIOLOGY

## 2025-02-11 PROCEDURE — 6360000002 HC RX W HCPCS: Performed by: ANESTHESIOLOGY

## 2025-02-11 PROCEDURE — 7100000010 HC PHASE II RECOVERY - FIRST 15 MIN: Performed by: ANESTHESIOLOGY

## 2025-02-11 PROCEDURE — 82948 REAGENT STRIP/BLOOD GLUCOSE: CPT

## 2025-02-11 PROCEDURE — 27096 INJECT SACROILIAC JOINT: CPT | Performed by: ANESTHESIOLOGY

## 2025-02-11 PROCEDURE — 7100000011 HC PHASE II RECOVERY - ADDTL 15 MIN: Performed by: ANESTHESIOLOGY

## 2025-02-11 PROCEDURE — 2709999900 HC NON-CHARGEABLE SUPPLY: Performed by: ANESTHESIOLOGY

## 2025-02-11 RX ORDER — LIDOCAINE HYDROCHLORIDE 10 MG/ML
INJECTION, SOLUTION EPIDURAL; INFILTRATION; INTRACAUDAL; PERINEURAL PRN
Status: DISCONTINUED | OUTPATIENT
Start: 2025-02-11 | End: 2025-02-11 | Stop reason: ALTCHOICE

## 2025-02-11 RX ORDER — MIDAZOLAM HYDROCHLORIDE 1 MG/ML
INJECTION, SOLUTION INTRAMUSCULAR; INTRAVENOUS PRN
Status: DISCONTINUED | OUTPATIENT
Start: 2025-02-11 | End: 2025-02-11 | Stop reason: ALTCHOICE

## 2025-02-11 RX ORDER — FENTANYL CITRATE 50 UG/ML
INJECTION, SOLUTION INTRAMUSCULAR; INTRAVENOUS PRN
Status: DISCONTINUED | OUTPATIENT
Start: 2025-02-11 | End: 2025-02-11 | Stop reason: ALTCHOICE

## 2025-02-11 RX ORDER — BUPIVACAINE HYDROCHLORIDE 5 MG/ML
INJECTION, SOLUTION PERINEURAL PRN
Status: DISCONTINUED | OUTPATIENT
Start: 2025-02-11 | End: 2025-02-11 | Stop reason: ALTCHOICE

## 2025-02-11 RX ORDER — METHYLPREDNISOLONE ACETATE 80 MG/ML
INJECTION, SUSPENSION INTRA-ARTICULAR; INTRALESIONAL; INTRAMUSCULAR; SOFT TISSUE PRN
Status: DISCONTINUED | OUTPATIENT
Start: 2025-02-11 | End: 2025-02-11 | Stop reason: ALTCHOICE

## 2025-02-11 ASSESSMENT — PAIN - FUNCTIONAL ASSESSMENT
PAIN_FUNCTIONAL_ASSESSMENT: 0-10
PAIN_FUNCTIONAL_ASSESSMENT: 0-10

## 2025-02-11 ASSESSMENT — PAIN DESCRIPTION - DESCRIPTORS: DESCRIPTORS: ACHING

## 2025-02-11 NOTE — POST SEDATION
[facility]  Sedation/Analgesia Post Sedation Record    Pt Name: Jose Romero  MRN: [id number]  YOB: 1960  Procedure Performed By: Jamie Nelson DO  Primary Care Physician: No primary care provider on file.    POST-PROCEDURE    Physicians/Assistants: Jamie Nelson DO  Procedure Performed: See Procedure Note   Sedation/Anesthesia: Versed and Fentanyl (See procedure note for amount and duration)  Estimated Blood Loss:     0  ml  Specimens Removed: None        Complications: None           Jamie Nelson DO  Electronically signed 2/11/2025 at 2:16 PM

## 2025-02-11 NOTE — PROCEDURES
Pre-operative Diagnosis:  SI joint pain     Post-operative Diagnosis:  SI joint pain     Procedure: Bilateral SI joint injection     Procedure Description:  After having signed the informed consent, the patient was placed in the prone position.  The patient's back was prepped with chloraprep solution, and draped in a sterile fashion. A total of 2 ml of 1% lidocaine were used to anesthetize the skin and underlying tissues.  Under fluoroscopic guidance a single 22-gauge, 3.5 inch spinal needle was advanced to lie within the inferior pole of the RIGHT sacroiliac joint.  There were no paresthesias or heme aspiration. Needle placement was confirmed in the AP view.  After negative aspiration, 0.5 ml of Omnipaque 300 contrast was injected with appropriate spread observed.  A total of 4 ml of 0.5% bupivicaine mixed with 40 mg depo-medrol were injected into the sacroiliac joint. The needle was withdrawn without any complications. This exact procedure was repeated on the contralateral side. The patient tolerated the procedure well, was transported to the recovery room and observed for 15 minutes and discharged in an ambulatory fashion. No immediate reported complications.    Procedural Complications: None  Estimated Blood Loss: 0 mL    IV sedation was used during the procedure:  - Moderate intravenous conscious sedation was supervised by Dr. Nelson  - The patient was independently monitored by a Registered Nurse assigned to the procedure room  - Monitoring included automated blood pressure, continuous EKG, and continuous pulse oximetry  - The detailed conscious record is permanently stored in the Hospital Information System  - The following is the conscious sedation record:  Start Time: 12:21  End Time : 12:36  Duration: 15 minutes   Medications Administered: 2 mg Versed, 50 mcg Fentanyl      Jamie Nelson DO  Interventional Pain Management/PM&R   [facility]

## 2025-02-11 NOTE — PRE SEDATION
[facility]  Pre-Sedation/Analgesia History & Physical    Pt Name: Jose Romero  MRN: [id number]  YOB: 1960  Provider Performing Procedure: Jamie Nelson DO   Primary Care Physician: No primary care provider on file.      MEDICAL HISTORY       has a past medical history of Arthritis, CAD (coronary artery disease), Cancer (HCC), COPD (chronic obstructive pulmonary disease) (HCC), Diabetes mellitus (HCC), Hyperlipidemia, and Hypertension.  SURGICAL HISTORY   has a past surgical history that includes fracture surgery; Cardiac surgery; Coronary stent placement; Colonoscopy (N/A, 01/30/2023); Cardiac catheterization (05/2024); Pacemaker insertion (11/04/2024); Facet joint injection (Bilateral, 11/14/2024); and Pain management procedure (Bilateral, 12/4/2024).    ALLERGIES   Allergies as of 01/15/2025 - Fully Reviewed 01/15/2025   Allergen Reaction Noted    Olodaterol Hives 07/03/2024    Tiotropium Hives 07/03/2024       MEDICATIONS   Prior to Admission medications    Medication Sig Start Date End Date Taking? Authorizing Provider   OXYGEN Inhale 3 L into the lungs continuous prn for Shortness of Breath   Yes ProviderChelsea MD   HYDROcodone-acetaminophen (NORCO) 7.5-325 MG per tablet Take 1 tablet by mouth 2 times daily as needed for Pain for up to 30 days. Max Daily Amount: 2 tablets 1/15/25 2/14/25 Yes Elizabeth Wiley APRN - CNP   gabapentin (NEURONTIN) 300 MG capsule 300 mg in the morning, 600 mg at night 1/15/25 2/15/25 Yes Elizabeth Wiley APRN - CNP   finasteride (PROSCAR) 5 MG tablet Take 1 tablet by mouth in the morning and at bedtime 1/3/24  Yes ProviderChelsea MD   Semaglutide,0.25 or 0.5MG/DOS, (OZEMPIC, 0.25 OR 0.5 MG/DOSE,) 2 MG/3ML SOPN Inject 1 mg into the skin once a week   Yes ProviderChelsea MD   empagliflozin (JARDIANCE) 25 MG tablet Take 10 mg by mouth daily 1/3/24  Yes Chelsea Juan MD   levothyroxine (SYNTHROID) 25 MCG tablet Take 3

## 2025-02-11 NOTE — PROGRESS NOTES
1228: Patient arrives to recovery room via cart.  Spontaneous respirations, vss, report received from surgical RN.  Patient denies pain, numbness, tingling , nausea.  Injection site clean, dry, intact. HOB elevated. IV capped. Snack and drink given.  Call light within reach.        1240: patient getting dressed, IV removed.  Ride called.     1250: patient ambulated to discharge lobby in stable condition.  Patient discharged home with [place].  All personal belongings sent with patient.

## 2025-02-13 DIAGNOSIS — M47.816 LUMBAR SPONDYLOSIS: ICD-10-CM

## 2025-02-13 RX ORDER — HYDROCODONE BITARTRATE AND ACETAMINOPHEN 7.5; 325 MG/1; MG/1
1 TABLET ORAL 2 TIMES DAILY PRN
Qty: 60 TABLET | Refills: 0 | Status: SHIPPED | OUTPATIENT
Start: 2025-02-14 | End: 2025-03-11 | Stop reason: SDUPTHER

## 2025-02-13 RX ORDER — GABAPENTIN 300 MG/1
CAPSULE ORAL
Qty: 90 CAPSULE | Refills: 0 | Status: CANCELLED | OUTPATIENT
Start: 2025-02-13 | End: 2025-03-16

## 2025-02-13 NOTE — TELEPHONE ENCOUNTER
Jose Romero called requesting a refill on the following medications:  Requested Prescriptions     Pending Prescriptions Disp Refills    HYDROcodone-acetaminophen (NORCO) 7.5-325 MG per tablet 60 tablet 0     Sig: Take 1 tablet by mouth 2 times daily as needed for Pain for up to 30 days. Max Daily Amount: 2 tablets    gabapentin (NEURONTIN) 300 MG capsule 90 capsule 0     Si mg in the morning, 600 mg at night     Pharmacy verified:    [facility] - [place], [place] - [address] P [phone] - F [phone]     Date of last visit: 01/15/2025  Date of next visit (if applicable): 3/11/2025

## 2025-02-13 NOTE — TELEPHONE ENCOUNTER
OARRS reviewed. UDS: + for Gabapentin, Hydrocodone.   Last seen: 1/15/2025. Follow-up:   Future Appointments   Date Time Provider Department Center   3/11/2025 11:20 AM Elizabeth Wiley APRN - CNP [facility] [place]

## 2025-03-11 ENCOUNTER — OFFICE VISIT (OUTPATIENT)
Dept: PHYSICAL MEDICINE AND REHAB | Age: 65
End: 2025-03-11
Payer: OTHER GOVERNMENT

## 2025-03-11 VITALS
HEIGHT: 69 IN | BODY MASS INDEX: 32.44 KG/M2 | SYSTOLIC BLOOD PRESSURE: 108 MMHG | DIASTOLIC BLOOD PRESSURE: 66 MMHG | WEIGHT: 219 LBS

## 2025-03-11 DIAGNOSIS — M47.816 LUMBAR SPONDYLOSIS: ICD-10-CM

## 2025-03-11 DIAGNOSIS — G89.4 CHRONIC PAIN SYNDROME: ICD-10-CM

## 2025-03-11 DIAGNOSIS — M53.3 SI (SACROILIAC) JOINT DYSFUNCTION: Primary | ICD-10-CM

## 2025-03-11 DIAGNOSIS — D17.79 EPIDURAL LIPOMATOSIS: ICD-10-CM

## 2025-03-11 PROCEDURE — 99214 OFFICE O/P EST MOD 30 MIN: CPT | Performed by: NURSE PRACTITIONER

## 2025-03-11 RX ORDER — HYDROCODONE BITARTRATE AND ACETAMINOPHEN 7.5; 325 MG/1; MG/1
1 TABLET ORAL 2 TIMES DAILY PRN
Qty: 60 TABLET | Refills: 0 | Status: SHIPPED | OUTPATIENT
Start: 2025-03-16 | End: 2025-04-15

## 2025-03-11 RX ORDER — ORPHENADRINE CITRATE 100 MG/1
100 TABLET ORAL 2 TIMES DAILY
Qty: 60 TABLET | Refills: 0 | Status: SHIPPED | OUTPATIENT
Start: 2025-03-11 | End: 2025-04-10

## 2025-03-11 RX ORDER — MULTIVITAMIN WITH IRON
250 TABLET ORAL DAILY
COMMUNITY

## 2025-03-11 NOTE — PROGRESS NOTES
Functionality Assessment/Goals Worksheet     On a scale of 0 (Does not Interfere) to 10 (Completely Interferes)     1.  Which number describes how during the past week pain has interfered with           the following:  A.  General Activity:  7  B.  Mood: 10  C.  Walking Ability:  7  D.  Normal Work (Includes both work outside the home and housework):  8  E.  Relations with Other People:   10  F.  Sleep:   10  G.  Enjoyment of Life:   10    2.  Patient Prefers to Take their Pain Medications:     [x]  On a regular basis   []  Only when necessary    []  Does not take pain medications    3.  What are the Patient's Goals/Expectations for Visiting Pain Management?     []  Learn about my pain    []  Receive Medication   []  Physical Therapy     []  Treat Depression   []  Receive Injections    [x]  Treat Sleep   [x]  Deal with Anxiety and Stress   []  Treat Opoid Dependence/Addiction   []  Other:

## 2025-03-11 NOTE — PROGRESS NOTES
Chronic Pain/PM&R Clinic Note     Encounter Date: 3/11/25    Subjective:   Chief Complaint:   Chief Complaint   Patient presents with    Follow Up After Procedure     Donnie. SI Joint injection 2/11/25     History of Present Illness:   Jose Romero is a 65 y.o. male seen in the clinic initially on 09/20/2024 upon request from [facility]  for his history of low back pain.  Patient states his low back pain started several years ago without any specific inciting event.  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  He states he had some lifting injuries and falls.  He states the majority of his pain is in his low back, left hip.  He will occasionally get pain from his tailbone down to his posterior lateral knee on the left and occasionally on the right.  He states this does not happen on a daily basis.  His low back pain is constant, he describes this as a dull ache and certain activities make it more intense.  He states pain is aggravated with standing, sitting, walking for extended period of time.  He occasionally gets burning in his back.  He states pain will interfere with sleep as he cannot lay in 1 position for too long he states he will wake up every hour to hour and 15 minutes to adjust positions.  He also sleeps with a CPAP at night.  He states he tried physical therapy back in May which really did not help.  He tried massage therapy and chiropractic care several years ago.  He states he has been on the Norco for several years and it has become less effective recently, he takes this twice a day.  He does not really want to go up on the medication but also \" has to do something\".  He states he was seeing Dr. Franco who recommended spinal cord stimulation but this was denied by [facility].  He has not tried any other injections and never had a prior back surgery.  He is leery about injections but may be open to this option.  He states he is scared of needles and does not want to be

## 2025-03-17 ENCOUNTER — TELEPHONE (OUTPATIENT)
Dept: PHYSICAL MEDICINE AND REHAB | Age: 65
End: 2025-03-17

## 2025-04-11 ENCOUNTER — OFFICE VISIT (OUTPATIENT)
Dept: PHYSICAL MEDICINE AND REHAB | Age: 65
End: 2025-04-11
Payer: OTHER GOVERNMENT

## 2025-04-11 VITALS
HEIGHT: 69 IN | SYSTOLIC BLOOD PRESSURE: 92 MMHG | DIASTOLIC BLOOD PRESSURE: 58 MMHG | WEIGHT: 218.92 LBS | BODY MASS INDEX: 32.42 KG/M2

## 2025-04-11 DIAGNOSIS — G89.4 CHRONIC PAIN SYNDROME: ICD-10-CM

## 2025-04-11 DIAGNOSIS — M53.3 SI (SACROILIAC) JOINT DYSFUNCTION: Primary | ICD-10-CM

## 2025-04-11 DIAGNOSIS — M53.3 SACROILIAC JOINT DYSFUNCTION OF LEFT SIDE: ICD-10-CM

## 2025-04-11 DIAGNOSIS — M47.816 LUMBAR SPONDYLOSIS: ICD-10-CM

## 2025-04-11 DIAGNOSIS — E88.2 EPIDURAL LIPOMATOSIS: ICD-10-CM

## 2025-04-11 PROCEDURE — 99214 OFFICE O/P EST MOD 30 MIN: CPT | Performed by: NURSE PRACTITIONER

## 2025-04-11 RX ORDER — AMITRIPTYLINE HYDROCHLORIDE 10 MG/1
10 TABLET ORAL NIGHTLY PRN
Qty: 30 TABLET | Refills: 1 | Status: SHIPPED | OUTPATIENT
Start: 2025-04-11

## 2025-04-11 RX ORDER — HYDROCODONE BITARTRATE AND ACETAMINOPHEN 7.5; 325 MG/1; MG/1
1 TABLET ORAL 2 TIMES DAILY PRN
Qty: 60 TABLET | Refills: 0 | Status: SHIPPED | OUTPATIENT
Start: 2025-04-13 | End: 2025-05-13

## 2025-04-11 NOTE — PROGRESS NOTES
Chronic Pain/PM&R Clinic Note     Encounter Date: 4/11/25    Subjective:   Chief Complaint:   Chief Complaint   Patient presents with    Follow-up     History of Present Illness:   Jose Romero is a 65 y.o. male seen in the clinic initially on 09/20/2024 upon request from [facility]  for his history of low back pain.  Patient states his low back pain started several years ago without any specific inciting event.  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  He states he had some lifting injuries and falls.  He states the majority of his pain is in his low back, left hip.  He will occasionally get pain from his tailbone down to his posterior lateral knee on the left and occasionally on the right.  He states this does not happen on a daily basis.  His low back pain is constant, he describes this as a dull ache and certain activities make it more intense.  He states pain is aggravated with standing, sitting, walking for extended period of time.  He occasionally gets burning in his back.  He states pain will interfere with sleep as he cannot lay in 1 position for too long he states he will wake up every hour to hour and 15 minutes to adjust positions.  He also sleeps with a CPAP at night.  He states he tried physical therapy back in May which really did not help.  He tried massage therapy and chiropractic care several years ago.  He states he has been on the Norco for several years and it has become less effective recently, he takes this twice a day.  He does not really want to go up on the medication but also \" has to do something\".  He states he was seeing Dr. Franco who recommended spinal cord stimulation but this was denied by [facility].  He has not tried any other injections and never had a prior back surgery.  He is leery about injections but may be open to this option.  He states he is scared of needles and does not want to be paralyzed.  He denies any saddle anesthesia, or

## 2025-04-11 NOTE — PROGRESS NOTES
Functionality Assessment/Goals Worksheet     On a scale of 0 (Does not Interfere) to 10 (Completely Interferes)     1.  Which number describes how during the past week pain has interfered with           the following:  A.  General Activity:  6  B.  Mood: 8  C.  Walking Ability:  8  D.  Normal Work (Includes both work outside the home and housework):  9  E.  Relations with Other People:   6  F.  Sleep:   9  G.  Enjoyment of Life:   8    2.  Patient Prefers to Take their Pain Medications:     [x]  On a regular basis   []  Only when necessary    []  Does not take pain medications    3.  What are the Patient's Goals/Expectations for Visiting Pain Management?     [x]  Learn about my pain    [x]  Receive Medication   []  Physical Therapy     []  Treat Depression   []  Receive Injections    [x]  Treat Sleep   [x]  Deal with Anxiety and Stress   []  Treat Opoid Dependence/Addiction   []  Other:

## 2025-05-13 ENCOUNTER — OFFICE VISIT (OUTPATIENT)
Dept: PHYSICAL MEDICINE AND REHAB | Age: 65
End: 2025-05-13
Payer: OTHER GOVERNMENT

## 2025-05-13 VITALS
HEIGHT: 69 IN | WEIGHT: 218 LBS | SYSTOLIC BLOOD PRESSURE: 104 MMHG | DIASTOLIC BLOOD PRESSURE: 60 MMHG | BODY MASS INDEX: 32.29 KG/M2

## 2025-05-13 DIAGNOSIS — M47.816 LUMBAR SPONDYLOSIS: ICD-10-CM

## 2025-05-13 DIAGNOSIS — M53.3 SACROILIAC JOINT DYSFUNCTION OF LEFT SIDE: ICD-10-CM

## 2025-05-13 DIAGNOSIS — M53.3 SI (SACROILIAC) JOINT DYSFUNCTION: Primary | ICD-10-CM

## 2025-05-13 DIAGNOSIS — G89.4 CHRONIC PAIN SYNDROME: ICD-10-CM

## 2025-05-13 DIAGNOSIS — E88.2 EPIDURAL LIPOMATOSIS: ICD-10-CM

## 2025-05-13 PROCEDURE — 99214 OFFICE O/P EST MOD 30 MIN: CPT | Performed by: NURSE PRACTITIONER

## 2025-05-13 RX ORDER — HYDROCODONE BITARTRATE AND ACETAMINOPHEN 7.5; 325 MG/1; MG/1
1 TABLET ORAL 2 TIMES DAILY PRN
Qty: 60 TABLET | Refills: 0 | Status: SHIPPED | OUTPATIENT
Start: 2025-05-13 | End: 2025-06-12

## 2025-05-13 RX ORDER — FERROUS SULFATE 325(65) MG
325 TABLET ORAL
COMMUNITY

## 2025-05-13 RX ORDER — ORPHENADRINE CITRATE 100 MG/1
100 TABLET ORAL 2 TIMES DAILY
Qty: 60 TABLET | Refills: 2 | Status: SHIPPED | OUTPATIENT
Start: 2025-05-13 | End: 2025-08-11

## 2025-05-13 RX ORDER — AMITRIPTYLINE HYDROCHLORIDE 10 MG/1
10 TABLET ORAL NIGHTLY PRN
Qty: 30 TABLET | Refills: 2 | Status: SHIPPED | OUTPATIENT
Start: 2025-05-13

## 2025-05-13 NOTE — PROGRESS NOTES
Functionality Assessment/Goals Worksheet     On a scale of 0 (Does not Interfere) to 10 (Completely Interferes)     1.  Which number describes how during the past week pain has interfered with           the following:  A.  General Activity:  7  B.  Mood: 6  C.  Walking Ability:  7  D.  Normal Work (Includes both work outside the home and housework):  8  E.  Relations with Other People:   5  F.  Sleep:   8  G.  Enjoyment of Life:   9    2.  Patient Prefers to Take their Pain Medications:     [x]  On a regular basis   []  Only when necessary    []  Does not take pain medications    3.  What are the Patient's Goals/Expectations for Visiting Pain Management?     []  Learn about my pain    [x]  Receive Medication   []  Physical Therapy     []  Treat Depression   []  Receive Injections    []  Treat Sleep   [x]  Deal with Anxiety and Stress   []  Treat Opoid Dependence/Addiction   []  Other:                                
stay compliant with treatment plan outlined above in order to stay compliant with opioid therapy and any deviation from treatment plan will result in cessation of opioid therapy.  Risks of long-term opioid therapy were discussed in extensive detail with the patient and patient understands the risks involved with continuous opioids to manage chronic pain.     Patient has been compliant with office visits, the rehabilitation plan including attending therapy as warranted, and injection therapy.  Patient has not demonstrated any aberrant behavior with medication.  Pain contract signed and reviewed by patient 09/20/2024.  Patient understands if the contract is violated in any way opioid therapy will be discontinued immediately and/or offered an appropriate weaning schedule.  OARRS reviewed and is appropriate.  Naloxone offered to patient.  Last UDS: appropriate     Adjuvants:   Continue Norflex 100 mg twice daily.    Continue amitriptyline 10 mg nightly.    Interventions:   None     Multidisciplinary Pain Management:   In the presence of complex, chronic, and multi-factorial pain, the importance of a multidisciplinary approach to pain management in the patient’s management regimen was emphasized and discussed in great detail.   PHYSICAL THERAPY: Patient is advised to see a physical therapist for gentle stretching exercises and conditioning exercises for management of pain.   PSYCHOLOGY: Patient is advised to see a clinical pain psychologist, for the psychosocial aspect of pain care through coping skills, relaxation strategies, cognitive group therapy etc.   OBESITY: Patient is advised to seek nutrition consult to help in managing their weight to decrease its impact on pain.     Referrals:  None    Prescriptions Written This Visit:   Amitriptyline 10 mg  Norflex 100 mg  Norco 7.5 mg    Follow-up: 3 months    Elizabeth Wiley, [place] - CNP

## 2025-06-16 DIAGNOSIS — M47.816 LUMBAR SPONDYLOSIS: ICD-10-CM

## 2025-06-16 RX ORDER — HYDROCODONE BITARTRATE AND ACETAMINOPHEN 7.5; 325 MG/1; MG/1
1 TABLET ORAL 2 TIMES DAILY PRN
Qty: 60 TABLET | Refills: 0 | Status: SHIPPED | OUTPATIENT
Start: 2025-06-16 | End: 2025-07-16

## 2025-06-16 RX ORDER — HYDROCODONE BITARTRATE AND ACETAMINOPHEN 7.5; 325 MG/1; MG/1
1 TABLET ORAL 2 TIMES DAILY PRN
Qty: 60 TABLET | Refills: 0 | Status: SHIPPED | OUTPATIENT
Start: 2025-06-16 | End: 2025-06-16 | Stop reason: SDUPTHER

## 2025-06-16 NOTE — TELEPHONE ENCOUNTER
This has gone to [facility] in past - it is set up for [facility] so can you double check this.

## 2025-06-16 NOTE — TELEPHONE ENCOUNTER
OARRS reviewed. UDS: + for hydrocodone,gabapentin. Negative hydroxyzine .   Last seen: 5/13/2025. Follow-up:   Future Appointments   Date Time Provider Department Center   8/13/2025  8:00 AM Elizabeth Wiley APRN - CNP [facility] [place]

## 2025-07-21 DIAGNOSIS — M47.816 LUMBAR SPONDYLOSIS: ICD-10-CM

## 2025-07-21 RX ORDER — HYDROCODONE BITARTRATE AND ACETAMINOPHEN 7.5; 325 MG/1; MG/1
1 TABLET ORAL 2 TIMES DAILY PRN
Qty: 60 TABLET | Refills: 0 | Status: SHIPPED | OUTPATIENT
Start: 2025-07-21 | End: 2025-08-20

## 2025-07-21 NOTE — TELEPHONE ENCOUNTER
OARRS reviewed. UDS: No UDS on file.   Last seen: 5/13/2025. Follow-up:   Future Appointments   Date Time Provider Department Center   8/13/2025  8:00 AM Elizabeth Wiley APRN - CNP [facility] [place]

## 2025-07-21 NOTE — TELEPHONE ENCOUNTER
Jose Romero called requesting a refill on the following medications:  Requested Prescriptions     Pending Prescriptions Disp Refills    HYDROcodone-acetaminophen (NORCO) 7.5-325 MG per tablet 60 tablet 0     Sig: Take 1 tablet by mouth 2 times daily as needed for Pain for up to 30 days. Max Daily Amount: 2 tablets     Pharmacy verified:  [facility]      Date of last visit: 05-13-25  Date of next visit (if applicable): 8/13/2025

## 2025-08-15 ENCOUNTER — OFFICE VISIT (OUTPATIENT)
Dept: PHYSICAL MEDICINE AND REHAB | Age: 65
End: 2025-08-15
Payer: OTHER GOVERNMENT

## 2025-08-15 VITALS
HEIGHT: 69 IN | DIASTOLIC BLOOD PRESSURE: 70 MMHG | BODY MASS INDEX: 32.29 KG/M2 | WEIGHT: 218.03 LBS | SYSTOLIC BLOOD PRESSURE: 132 MMHG

## 2025-08-15 DIAGNOSIS — M47.816 LUMBAR SPONDYLOSIS: ICD-10-CM

## 2025-08-15 DIAGNOSIS — E88.2 EPIDURAL LIPOMATOSIS: ICD-10-CM

## 2025-08-15 DIAGNOSIS — G89.4 CHRONIC PAIN SYNDROME: ICD-10-CM

## 2025-08-15 DIAGNOSIS — M53.3 SI (SACROILIAC) JOINT DYSFUNCTION: Primary | ICD-10-CM

## 2025-08-15 PROCEDURE — 99214 OFFICE O/P EST MOD 30 MIN: CPT | Performed by: NURSE PRACTITIONER

## 2025-08-15 RX ORDER — HYDROCODONE BITARTRATE AND ACETAMINOPHEN 7.5; 325 MG/1; MG/1
1 TABLET ORAL 2 TIMES DAILY PRN
Qty: 60 TABLET | Refills: 0 | Status: SHIPPED | OUTPATIENT
Start: 2025-08-15 | End: 2025-09-14

## 2025-08-15 RX ORDER — ASPIRIN 81 MG/1
81 TABLET ORAL DAILY
COMMUNITY

## 2025-08-15 RX ORDER — ORPHENADRINE CITRATE 100 MG/1
100 TABLET ORAL 2 TIMES DAILY
Qty: 60 TABLET | Refills: 5 | Status: SHIPPED | OUTPATIENT
Start: 2025-08-15 | End: 2026-02-11

## (undated) DEVICE — HYPODERMIC SAFETY NEEDLE: Brand: MAGELLAN

## (undated) DEVICE — NEEDLE SPNL 22GA L3.5IN BLK HUB S STL REG WALL FIT STYL

## (undated) DEVICE — APPLICATOR MEDICATED 26 CC SOLUTION HI LT ORNG CHLORAPREP

## (undated) DEVICE — GLOVE ORTHO 8   MSG9480

## (undated) DEVICE — SYRINGE MEDICAL 3ML CLEAR PLASTIC STANDARD NON CONTROL LUERLOCK TIP DISPOSABLE

## (undated) DEVICE — BIOGUARD A/W CLEANING ADAPTER

## (undated) DEVICE — SYRINGE MED 5ML STD CLR PLAS LUERLOCK TIP N CTRL DISP

## (undated) DEVICE — GLOVE ORANGE PI 8 1/2   MSG9085

## (undated) DEVICE — SC PAIN PACK: Brand: MEDLINE INDUSTRIES, INC.